# Patient Record
Sex: FEMALE | Race: WHITE | Employment: OTHER | URBAN - METROPOLITAN AREA
[De-identification: names, ages, dates, MRNs, and addresses within clinical notes are randomized per-mention and may not be internally consistent; named-entity substitution may affect disease eponyms.]

---

## 2018-05-16 ENCOUNTER — HOSPITAL ENCOUNTER (OUTPATIENT)
Age: 63
Discharge: HOME | End: 2018-05-18
Payer: COMMERCIAL

## 2018-05-16 LAB
ALBUMIN SERPL-MCNC: 4.3 G/DL (ref 3.5–5.2)
ALP BLD-CCNC: 52 U/L (ref 35–104)
ALT SERPL-CCNC: 13 U/L (ref 0–32)
ANION GAP SERPL CALCULATED.3IONS-SCNC: 15 MMOL/L (ref 7–16)
AST SERPL-CCNC: 19 U/L (ref 0–31)
BASOPHILS ABSOLUTE: 0.03 E9/L (ref 0–0.2)
BASOPHILS RELATIVE PERCENT: 0.8 % (ref 0–2)
BILIRUB SERPL-MCNC: 0.4 MG/DL (ref 0–1.2)
BUN BLDV-MCNC: 9 MG/DL (ref 8–23)
CALCIUM SERPL-MCNC: 9.2 MG/DL (ref 8.6–10.2)
CHLORIDE BLD-SCNC: 104 MMOL/L (ref 98–107)
CHOLESTEROL, TOTAL: 248 MG/DL (ref 0–199)
CO2: 23 MMOL/L (ref 22–29)
CREAT SERPL-MCNC: 0.6 MG/DL (ref 0.5–1)
EOSINOPHILS ABSOLUTE: 0.16 E9/L (ref 0.05–0.5)
EOSINOPHILS RELATIVE PERCENT: 4.2 % (ref 0–6)
GFR AFRICAN AMERICAN: >60
GFR NON-AFRICAN AMERICAN: >60 ML/MIN/1.73
GLUCOSE BLD-MCNC: 94 MG/DL (ref 74–109)
HCT VFR BLD CALC: 43.4 % (ref 34–48)
HDLC SERPL-MCNC: 96 MG/DL
HEMOGLOBIN: 14.3 G/DL (ref 11.5–15.5)
IMMATURE GRANULOCYTES #: 0.01 E9/L
IMMATURE GRANULOCYTES %: 0.3 % (ref 0–5)
LDL CHOLESTEROL CALCULATED: 135 MG/DL (ref 0–99)
LYMPHOCYTES ABSOLUTE: 1.4 E9/L (ref 1.5–4)
LYMPHOCYTES RELATIVE PERCENT: 37.1 % (ref 20–42)
MCH RBC QN AUTO: 33.4 PG (ref 26–35)
MCHC RBC AUTO-ENTMCNC: 32.9 % (ref 32–34.5)
MCV RBC AUTO: 101.4 FL (ref 80–99.9)
MONOCYTES ABSOLUTE: 0.33 E9/L (ref 0.1–0.95)
MONOCYTES RELATIVE PERCENT: 8.8 % (ref 2–12)
NEUTROPHILS ABSOLUTE: 1.84 E9/L (ref 1.8–7.3)
NEUTROPHILS RELATIVE PERCENT: 48.8 % (ref 43–80)
PDW BLD-RTO: 13 FL (ref 11.5–15)
PLATELET # BLD: 230 E9/L (ref 130–450)
PMV BLD AUTO: 9.2 FL (ref 7–12)
POTASSIUM SERPL-SCNC: 4.6 MMOL/L (ref 3.5–5)
RBC # BLD: 4.28 E12/L (ref 3.5–5.5)
SODIUM BLD-SCNC: 142 MMOL/L (ref 132–146)
TOTAL PROTEIN: 7 G/DL (ref 6.4–8.3)
TRIGL SERPL-MCNC: 86 MG/DL (ref 0–149)
TSH SERPL DL<=0.05 MIU/L-ACNC: 2.09 UIU/ML (ref 0.27–4.2)
VITAMIN D 25-HYDROXY: 20 NG/ML (ref 30–100)
VLDLC SERPL CALC-MCNC: 17 MG/DL
WBC # BLD: 3.8 E9/L (ref 4.5–11.5)

## 2018-05-16 PROCEDURE — 84443 ASSAY THYROID STIM HORMONE: CPT

## 2018-05-16 PROCEDURE — 82306 VITAMIN D 25 HYDROXY: CPT

## 2018-05-16 PROCEDURE — 80053 COMPREHEN METABOLIC PANEL: CPT

## 2018-05-16 PROCEDURE — 85025 COMPLETE CBC W/AUTO DIFF WBC: CPT

## 2018-05-16 PROCEDURE — 80061 LIPID PANEL: CPT

## 2019-01-12 ENCOUNTER — HOSPITAL ENCOUNTER (INPATIENT)
Age: 64
LOS: 1 days | Discharge: HOME | DRG: 244 | End: 2019-01-14
Attending: EMERGENCY MEDICINE | Admitting: INTERNAL MEDICINE
Payer: COMMERCIAL

## 2019-01-12 ENCOUNTER — APPOINTMENT (OUTPATIENT)
Dept: CT IMAGING | Age: 64
DRG: 244 | End: 2019-01-12
Payer: COMMERCIAL

## 2019-01-12 DIAGNOSIS — K57.32 DIVERTICULITIS OF COLON: Primary | ICD-10-CM

## 2019-01-12 DIAGNOSIS — R10.12 LEFT UPPER QUADRANT PAIN: ICD-10-CM

## 2019-01-12 DIAGNOSIS — T36.8X5A: ICD-10-CM

## 2019-01-12 LAB
ALBUMIN SERPL-MCNC: 4.4 G/DL (ref 3.5–5.2)
ALP BLD-CCNC: 73 U/L (ref 35–104)
ALT SERPL-CCNC: 16 U/L (ref 0–32)
ANION GAP SERPL CALCULATED.3IONS-SCNC: 13 MMOL/L (ref 7–16)
APTT: 34.8 SEC (ref 24.5–35.1)
AST SERPL-CCNC: 15 U/L (ref 0–31)
BASOPHILS ABSOLUTE: 0.05 E9/L (ref 0–0.2)
BASOPHILS RELATIVE PERCENT: 0.4 % (ref 0–2)
BILIRUB SERPL-MCNC: 0.6 MG/DL (ref 0–1.2)
BILIRUBIN URINE: ABNORMAL
BLOOD, URINE: NEGATIVE
BUN BLDV-MCNC: 11 MG/DL (ref 8–23)
CALCIUM SERPL-MCNC: 9.2 MG/DL (ref 8.6–10.2)
CHLORIDE BLD-SCNC: 103 MMOL/L (ref 98–107)
CLARITY: CLEAR
CO2: 22 MMOL/L (ref 22–29)
COLOR: YELLOW
CREAT SERPL-MCNC: 0.6 MG/DL (ref 0.5–1)
EOSINOPHILS ABSOLUTE: 0.2 E9/L (ref 0.05–0.5)
EOSINOPHILS RELATIVE PERCENT: 1.8 % (ref 0–6)
GFR AFRICAN AMERICAN: >60
GFR NON-AFRICAN AMERICAN: >60 ML/MIN/1.73
GLUCOSE BLD-MCNC: 133 MG/DL (ref 74–99)
GLUCOSE URINE: NEGATIVE MG/DL
HCT VFR BLD CALC: 43.5 % (ref 34–48)
HEMOGLOBIN: 14.8 G/DL (ref 11.5–15.5)
IMMATURE GRANULOCYTES #: 0.02 E9/L
IMMATURE GRANULOCYTES %: 0.2 % (ref 0–5)
INR BLD: 1
KETONES, URINE: ABNORMAL MG/DL
LACTIC ACID: 1 MMOL/L (ref 0.5–2.2)
LEUKOCYTE ESTERASE, URINE: NEGATIVE
LIPASE: 22 U/L (ref 13–60)
LYMPHOCYTES ABSOLUTE: 2.32 E9/L (ref 1.5–4)
LYMPHOCYTES RELATIVE PERCENT: 20.5 % (ref 20–42)
MCH RBC QN AUTO: 32.2 PG (ref 26–35)
MCHC RBC AUTO-ENTMCNC: 34 % (ref 32–34.5)
MCV RBC AUTO: 94.8 FL (ref 80–99.9)
MONOCYTES ABSOLUTE: 0.83 E9/L (ref 0.1–0.95)
MONOCYTES RELATIVE PERCENT: 7.3 % (ref 2–12)
NEUTROPHILS ABSOLUTE: 7.89 E9/L (ref 1.8–7.3)
NEUTROPHILS RELATIVE PERCENT: 69.8 % (ref 43–80)
NITRITE, URINE: NEGATIVE
PDW BLD-RTO: 12.6 FL (ref 11.5–15)
PH UA: 5 (ref 5–9)
PLATELET # BLD: 240 E9/L (ref 130–450)
PMV BLD AUTO: 8.9 FL (ref 7–12)
POTASSIUM REFLEX MAGNESIUM: 4.4 MMOL/L (ref 3.5–5)
PROTEIN UA: NEGATIVE MG/DL
PROTHROMBIN TIME: 11.6 SEC (ref 9.3–12.4)
RBC # BLD: 4.59 E12/L (ref 3.5–5.5)
SODIUM BLD-SCNC: 138 MMOL/L (ref 132–146)
SPECIFIC GRAVITY UA: >=1.03 (ref 1–1.03)
TOTAL PROTEIN: 7.2 G/DL (ref 6.4–8.3)
UROBILINOGEN, URINE: 0.2 E.U./DL
WBC # BLD: 11.3 E9/L (ref 4.5–11.5)

## 2019-01-12 PROCEDURE — 74177 CT ABD & PELVIS W/CONTRAST: CPT

## 2019-01-12 PROCEDURE — 2580000003 HC RX 258: Performed by: RADIOLOGY

## 2019-01-12 PROCEDURE — 85610 PROTHROMBIN TIME: CPT

## 2019-01-12 PROCEDURE — 99285 EMERGENCY DEPT VISIT HI MDM: CPT

## 2019-01-12 PROCEDURE — 81003 URINALYSIS AUTO W/O SCOPE: CPT

## 2019-01-12 PROCEDURE — 83605 ASSAY OF LACTIC ACID: CPT

## 2019-01-12 PROCEDURE — 6360000004 HC RX CONTRAST MEDICATION: Performed by: RADIOLOGY

## 2019-01-12 PROCEDURE — 80053 COMPREHEN METABOLIC PANEL: CPT

## 2019-01-12 PROCEDURE — 83690 ASSAY OF LIPASE: CPT

## 2019-01-12 PROCEDURE — 85025 COMPLETE CBC W/AUTO DIFF WBC: CPT

## 2019-01-12 PROCEDURE — 85730 THROMBOPLASTIN TIME PARTIAL: CPT

## 2019-01-12 PROCEDURE — 2580000003 HC RX 258: Performed by: NURSE PRACTITIONER

## 2019-01-12 RX ORDER — MORPHINE SULFATE 2 MG/ML
4 INJECTION, SOLUTION INTRAMUSCULAR; INTRAVENOUS ONCE
Status: DISCONTINUED | OUTPATIENT
Start: 2019-01-13 | End: 2019-01-14

## 2019-01-12 RX ORDER — 0.9 % SODIUM CHLORIDE 0.9 %
1000 INTRAVENOUS SOLUTION INTRAVENOUS ONCE
Status: COMPLETED | OUTPATIENT
Start: 2019-01-12 | End: 2019-01-13

## 2019-01-12 RX ORDER — SODIUM CHLORIDE 0.9 % (FLUSH) 0.9 %
10 SYRINGE (ML) INJECTION PRN
Status: DISCONTINUED | OUTPATIENT
Start: 2019-01-12 | End: 2019-01-14 | Stop reason: HOSPADM

## 2019-01-12 RX ORDER — CIPROFLOXACIN 2 MG/ML
400 INJECTION, SOLUTION INTRAVENOUS ONCE
Status: COMPLETED | OUTPATIENT
Start: 2019-01-13 | End: 2019-01-13

## 2019-01-12 RX ORDER — SODIUM CHLORIDE 9 MG/ML
1000 INJECTION, SOLUTION INTRAVENOUS CONTINUOUS
Status: DISCONTINUED | OUTPATIENT
Start: 2019-01-13 | End: 2019-01-13

## 2019-01-12 RX ORDER — 0.9 % SODIUM CHLORIDE 0.9 %
1000 INTRAVENOUS SOLUTION INTRAVENOUS ONCE
Status: DISCONTINUED | OUTPATIENT
Start: 2019-01-13 | End: 2019-01-14

## 2019-01-12 RX ORDER — LISINOPRIL 5 MG/1
5 TABLET ORAL DAILY
COMMUNITY
End: 2021-01-20 | Stop reason: SDUPTHER

## 2019-01-12 RX ORDER — ROSUVASTATIN CALCIUM 5 MG/1
5 TABLET, COATED ORAL EVERY OTHER DAY
COMMUNITY
End: 2021-01-20

## 2019-01-12 RX ORDER — ONDANSETRON 2 MG/ML
4 INJECTION INTRAMUSCULAR; INTRAVENOUS ONCE
Status: DISCONTINUED | OUTPATIENT
Start: 2019-01-13 | End: 2019-01-14

## 2019-01-12 RX ADMIN — Medication 10 ML: at 23:11

## 2019-01-12 RX ADMIN — SODIUM CHLORIDE 1000 ML: 9 INJECTION, SOLUTION INTRAVENOUS at 21:45

## 2019-01-12 RX ADMIN — IOPAMIDOL 110 ML: 755 INJECTION, SOLUTION INTRAVENOUS at 23:12

## 2019-01-13 PROBLEM — K57.92 DIVERTICULITIS: Status: ACTIVE | Noted: 2019-01-13

## 2019-01-13 PROBLEM — K57.92 DIVERTICULITIS: Status: RESOLVED | Noted: 2019-01-13 | Resolved: 2019-01-13

## 2019-01-13 LAB
ALBUMIN SERPL-MCNC: 3.7 G/DL (ref 3.5–5.2)
ALP BLD-CCNC: 55 U/L (ref 35–104)
ALT SERPL-CCNC: 12 U/L (ref 0–32)
ANION GAP SERPL CALCULATED.3IONS-SCNC: 10 MMOL/L (ref 7–16)
AST SERPL-CCNC: 10 U/L (ref 0–31)
BASOPHILS ABSOLUTE: 0.04 E9/L (ref 0–0.2)
BASOPHILS RELATIVE PERCENT: 0.6 % (ref 0–2)
BILIRUB SERPL-MCNC: 0.5 MG/DL (ref 0–1.2)
BUN BLDV-MCNC: 9 MG/DL (ref 8–23)
CALCIUM SERPL-MCNC: 8.3 MG/DL (ref 8.6–10.2)
CHLORIDE BLD-SCNC: 105 MMOL/L (ref 98–107)
CHOLESTEROL, TOTAL: 158 MG/DL (ref 0–199)
CO2: 23 MMOL/L (ref 22–29)
CREAT SERPL-MCNC: 0.6 MG/DL (ref 0.5–1)
EOSINOPHILS ABSOLUTE: 0.18 E9/L (ref 0.05–0.5)
EOSINOPHILS RELATIVE PERCENT: 2.5 % (ref 0–6)
FOLATE: 11.7 NG/ML (ref 4.8–24.2)
GFR AFRICAN AMERICAN: >60
GFR NON-AFRICAN AMERICAN: >60 ML/MIN/1.73
GLUCOSE BLD-MCNC: 108 MG/DL (ref 74–99)
HBA1C MFR BLD: 5.5 % (ref 4–5.6)
HCT VFR BLD CALC: 36.7 % (ref 34–48)
HDLC SERPL-MCNC: 70 MG/DL
HEMOGLOBIN: 12.3 G/DL (ref 11.5–15.5)
IMMATURE GRANULOCYTES #: 0.01 E9/L
IMMATURE GRANULOCYTES %: 0.1 % (ref 0–5)
LACTIC ACID, SEPSIS: 0.8 MMOL/L (ref 0.5–1.9)
LDL CHOLESTEROL CALCULATED: 70 MG/DL (ref 0–99)
LYMPHOCYTES ABSOLUTE: 2.25 E9/L (ref 1.5–4)
LYMPHOCYTES RELATIVE PERCENT: 31.9 % (ref 20–42)
MAGNESIUM: 2 MG/DL (ref 1.6–2.6)
MCH RBC QN AUTO: 32.5 PG (ref 26–35)
MCHC RBC AUTO-ENTMCNC: 33.5 % (ref 32–34.5)
MCV RBC AUTO: 96.8 FL (ref 80–99.9)
MONOCYTES ABSOLUTE: 0.54 E9/L (ref 0.1–0.95)
MONOCYTES RELATIVE PERCENT: 7.6 % (ref 2–12)
NEUTROPHILS ABSOLUTE: 4.04 E9/L (ref 1.8–7.3)
NEUTROPHILS RELATIVE PERCENT: 57.3 % (ref 43–80)
PDW BLD-RTO: 12.8 FL (ref 11.5–15)
PHOSPHORUS: 4.1 MG/DL (ref 2.5–4.5)
PLATELET # BLD: 189 E9/L (ref 130–450)
PMV BLD AUTO: 9.2 FL (ref 7–12)
POTASSIUM SERPL-SCNC: 3.8 MMOL/L (ref 3.5–5)
PRO-BNP: 65 PG/ML (ref 0–125)
PROCALCITONIN: 0.08 NG/ML (ref 0–0.08)
RBC # BLD: 3.79 E12/L (ref 3.5–5.5)
SODIUM BLD-SCNC: 138 MMOL/L (ref 132–146)
TOTAL PROTEIN: 5.8 G/DL (ref 6.4–8.3)
TRIGL SERPL-MCNC: 88 MG/DL (ref 0–149)
VITAMIN B-12: 275 PG/ML (ref 211–946)
VLDLC SERPL CALC-MCNC: 18 MG/DL
WBC # BLD: 7.1 E9/L (ref 4.5–11.5)

## 2019-01-13 PROCEDURE — 83735 ASSAY OF MAGNESIUM: CPT

## 2019-01-13 PROCEDURE — 2580000003 HC RX 258: Performed by: NURSE PRACTITIONER

## 2019-01-13 PROCEDURE — 82607 VITAMIN B-12: CPT

## 2019-01-13 PROCEDURE — 84145 PROCALCITONIN (PCT): CPT

## 2019-01-13 PROCEDURE — 2500000003 HC RX 250 WO HCPCS: Performed by: NURSE PRACTITIONER

## 2019-01-13 PROCEDURE — 97530 THERAPEUTIC ACTIVITIES: CPT

## 2019-01-13 PROCEDURE — 83880 ASSAY OF NATRIURETIC PEPTIDE: CPT

## 2019-01-13 PROCEDURE — 82746 ASSAY OF FOLIC ACID SERUM: CPT

## 2019-01-13 PROCEDURE — 80053 COMPREHEN METABOLIC PANEL: CPT

## 2019-01-13 PROCEDURE — 96374 THER/PROPH/DIAG INJ IV PUSH: CPT

## 2019-01-13 PROCEDURE — 1200000000 HC SEMI PRIVATE

## 2019-01-13 PROCEDURE — 85025 COMPLETE CBC W/AUTO DIFF WBC: CPT

## 2019-01-13 PROCEDURE — 87040 BLOOD CULTURE FOR BACTERIA: CPT

## 2019-01-13 PROCEDURE — 2500000003 HC RX 250 WO HCPCS: Performed by: INTERNAL MEDICINE

## 2019-01-13 PROCEDURE — 83605 ASSAY OF LACTIC ACID: CPT

## 2019-01-13 PROCEDURE — 6360000002 HC RX W HCPCS: Performed by: NURSE PRACTITIONER

## 2019-01-13 PROCEDURE — 6360000002 HC RX W HCPCS: Performed by: INTERNAL MEDICINE

## 2019-01-13 PROCEDURE — 36591 DRAW BLOOD OFF VENOUS DEVICE: CPT

## 2019-01-13 PROCEDURE — 97161 PT EVAL LOW COMPLEX 20 MIN: CPT

## 2019-01-13 PROCEDURE — 97116 GAIT TRAINING THERAPY: CPT

## 2019-01-13 PROCEDURE — 2580000003 HC RX 258: Performed by: EMERGENCY MEDICINE

## 2019-01-13 PROCEDURE — 36415 COLL VENOUS BLD VENIPUNCTURE: CPT

## 2019-01-13 PROCEDURE — 83036 HEMOGLOBIN GLYCOSYLATED A1C: CPT

## 2019-01-13 PROCEDURE — 6370000000 HC RX 637 (ALT 250 FOR IP): Performed by: INTERNAL MEDICINE

## 2019-01-13 PROCEDURE — 2580000003 HC RX 258: Performed by: INTERNAL MEDICINE

## 2019-01-13 PROCEDURE — 80061 LIPID PANEL: CPT

## 2019-01-13 PROCEDURE — 84100 ASSAY OF PHOSPHORUS: CPT

## 2019-01-13 RX ORDER — MORPHINE SULFATE 2 MG/ML
2 INJECTION, SOLUTION INTRAMUSCULAR; INTRAVENOUS
Status: DISCONTINUED | OUTPATIENT
Start: 2019-01-13 | End: 2019-01-14

## 2019-01-13 RX ORDER — ROSUVASTATIN CALCIUM 5 MG/1
5 TABLET, COATED ORAL DAILY
Status: DISCONTINUED | OUTPATIENT
Start: 2019-01-13 | End: 2019-01-14 | Stop reason: HOSPADM

## 2019-01-13 RX ORDER — SODIUM CHLORIDE 9 MG/ML
INJECTION, SOLUTION INTRAVENOUS CONTINUOUS
Status: DISCONTINUED | OUTPATIENT
Start: 2019-01-13 | End: 2019-01-14

## 2019-01-13 RX ORDER — MORPHINE SULFATE 2 MG/ML
4 INJECTION, SOLUTION INTRAMUSCULAR; INTRAVENOUS
Status: DISCONTINUED | OUTPATIENT
Start: 2019-01-13 | End: 2019-01-14

## 2019-01-13 RX ORDER — CIPROFLOXACIN 2 MG/ML
400 INJECTION, SOLUTION INTRAVENOUS EVERY 12 HOURS
Status: DISCONTINUED | OUTPATIENT
Start: 2019-01-13 | End: 2019-01-14 | Stop reason: HOSPADM

## 2019-01-13 RX ORDER — PANTOPRAZOLE SODIUM 40 MG/1
40 TABLET, DELAYED RELEASE ORAL
Status: DISCONTINUED | OUTPATIENT
Start: 2019-01-13 | End: 2019-01-14 | Stop reason: HOSPADM

## 2019-01-13 RX ORDER — OXYCODONE HYDROCHLORIDE AND ACETAMINOPHEN 5; 325 MG/1; MG/1
2 TABLET ORAL EVERY 4 HOURS PRN
Status: DISCONTINUED | OUTPATIENT
Start: 2019-01-13 | End: 2019-01-14

## 2019-01-13 RX ORDER — OXYCODONE HYDROCHLORIDE AND ACETAMINOPHEN 5; 325 MG/1; MG/1
1 TABLET ORAL EVERY 4 HOURS PRN
Status: DISCONTINUED | OUTPATIENT
Start: 2019-01-13 | End: 2019-01-14

## 2019-01-13 RX ORDER — LISINOPRIL 5 MG/1
5 TABLET ORAL DAILY
Status: DISCONTINUED | OUTPATIENT
Start: 2019-01-13 | End: 2019-01-14 | Stop reason: HOSPADM

## 2019-01-13 RX ORDER — ACETAMINOPHEN 325 MG/1
650 TABLET ORAL EVERY 4 HOURS PRN
Status: DISCONTINUED | OUTPATIENT
Start: 2019-01-13 | End: 2019-01-14 | Stop reason: HOSPADM

## 2019-01-13 RX ORDER — DIPHENHYDRAMINE HYDROCHLORIDE 50 MG/ML
25 INJECTION INTRAMUSCULAR; INTRAVENOUS ONCE
Status: COMPLETED | OUTPATIENT
Start: 2019-01-13 | End: 2019-01-13

## 2019-01-13 RX ADMIN — OXYCODONE AND ACETAMINOPHEN 1 TABLET: 5; 325 TABLET ORAL at 01:54

## 2019-01-13 RX ADMIN — METRONIDAZOLE 500 MG: 500 INJECTION, SOLUTION INTRAVENOUS at 10:31

## 2019-01-13 RX ADMIN — Medication 10 ML: at 01:36

## 2019-01-13 RX ADMIN — METRONIDAZOLE 500 MG: 500 INJECTION, SOLUTION INTRAVENOUS at 01:36

## 2019-01-13 RX ADMIN — LISINOPRIL 5 MG: 5 TABLET ORAL at 08:16

## 2019-01-13 RX ADMIN — CIPROFLOXACIN 400 MG: 2 INJECTION, SOLUTION INTRAVENOUS at 23:58

## 2019-01-13 RX ADMIN — SODIUM CHLORIDE: 9 INJECTION, SOLUTION INTRAVENOUS at 20:47

## 2019-01-13 RX ADMIN — CIPROFLOXACIN 400 MG: 2 INJECTION, SOLUTION INTRAVENOUS at 00:15

## 2019-01-13 RX ADMIN — ENOXAPARIN SODIUM 40 MG: 40 INJECTION SUBCUTANEOUS at 08:15

## 2019-01-13 RX ADMIN — METRONIDAZOLE 500 MG: 500 INJECTION, SOLUTION INTRAVENOUS at 18:35

## 2019-01-13 RX ADMIN — SODIUM CHLORIDE 1000 ML: 9 INJECTION, SOLUTION INTRAVENOUS at 00:15

## 2019-01-13 RX ADMIN — PANTOPRAZOLE SODIUM 40 MG: 40 TABLET, DELAYED RELEASE ORAL at 05:36

## 2019-01-13 RX ADMIN — DIPHENHYDRAMINE HYDROCHLORIDE 25 MG: 50 INJECTION INTRAMUSCULAR; INTRAVENOUS at 00:57

## 2019-01-13 RX ADMIN — CIPROFLOXACIN 400 MG: 2 INJECTION, SOLUTION INTRAVENOUS at 12:03

## 2019-01-13 RX ADMIN — ACETAMINOPHEN 650 MG: 325 TABLET ORAL at 20:46

## 2019-01-13 RX ADMIN — SODIUM CHLORIDE: 9 INJECTION, SOLUTION INTRAVENOUS at 01:39

## 2019-01-13 ASSESSMENT — PAIN DESCRIPTION - ONSET
ONSET: ON-GOING

## 2019-01-13 ASSESSMENT — PAIN SCALES - GENERAL
PAINLEVEL_OUTOF10: 5
PAINLEVEL_OUTOF10: 3
PAINLEVEL_OUTOF10: 2
PAINLEVEL_OUTOF10: 0
PAINLEVEL_OUTOF10: 4

## 2019-01-13 ASSESSMENT — PAIN DESCRIPTION - LOCATION
LOCATION: ABDOMEN
LOCATION: ABDOMEN
LOCATION: BACK
LOCATION: ABDOMEN

## 2019-01-13 ASSESSMENT — PAIN DESCRIPTION - ORIENTATION
ORIENTATION: MID;LOWER
ORIENTATION: LOWER
ORIENTATION: MID

## 2019-01-13 ASSESSMENT — PAIN DESCRIPTION - PROGRESSION
CLINICAL_PROGRESSION: GRADUALLY WORSENING
CLINICAL_PROGRESSION: NOT CHANGED

## 2019-01-13 ASSESSMENT — PAIN DESCRIPTION - PAIN TYPE
TYPE: ACUTE PAIN

## 2019-01-13 ASSESSMENT — PAIN DESCRIPTION - FREQUENCY
FREQUENCY: CONTINUOUS

## 2019-01-13 ASSESSMENT — PAIN DESCRIPTION - DESCRIPTORS
DESCRIPTORS: ACHING

## 2019-01-14 VITALS
WEIGHT: 162.8 LBS | RESPIRATION RATE: 16 BRPM | SYSTOLIC BLOOD PRESSURE: 130 MMHG | OXYGEN SATURATION: 97 % | TEMPERATURE: 98.5 F | HEART RATE: 74 BPM | HEIGHT: 64 IN | DIASTOLIC BLOOD PRESSURE: 62 MMHG | BODY MASS INDEX: 27.79 KG/M2

## 2019-01-14 LAB
ALBUMIN SERPL-MCNC: 3.4 G/DL (ref 3.5–5.2)
ALP BLD-CCNC: 48 U/L (ref 35–104)
ALT SERPL-CCNC: 10 U/L (ref 0–32)
ANION GAP SERPL CALCULATED.3IONS-SCNC: 8 MMOL/L (ref 7–16)
AST SERPL-CCNC: 10 U/L (ref 0–31)
BASOPHILS ABSOLUTE: 0.02 E9/L (ref 0–0.2)
BASOPHILS RELATIVE PERCENT: 0.4 % (ref 0–2)
BILIRUB SERPL-MCNC: 0.5 MG/DL (ref 0–1.2)
BUN BLDV-MCNC: 6 MG/DL (ref 8–23)
CALCIUM SERPL-MCNC: 8.2 MG/DL (ref 8.6–10.2)
CHLORIDE BLD-SCNC: 109 MMOL/L (ref 98–107)
CO2: 22 MMOL/L (ref 22–29)
CREAT SERPL-MCNC: 0.5 MG/DL (ref 0.5–1)
EOSINOPHILS ABSOLUTE: 0.19 E9/L (ref 0.05–0.5)
EOSINOPHILS RELATIVE PERCENT: 4 % (ref 0–6)
GFR AFRICAN AMERICAN: >60
GFR NON-AFRICAN AMERICAN: >60 ML/MIN/1.73
GLUCOSE BLD-MCNC: 98 MG/DL (ref 74–99)
HCT VFR BLD CALC: 34.1 % (ref 34–48)
HEMOGLOBIN: 11.6 G/DL (ref 11.5–15.5)
IMMATURE GRANULOCYTES #: 0.02 E9/L
IMMATURE GRANULOCYTES %: 0.4 % (ref 0–5)
LYMPHOCYTES ABSOLUTE: 1.56 E9/L (ref 1.5–4)
LYMPHOCYTES RELATIVE PERCENT: 32.8 % (ref 20–42)
MAGNESIUM: 1.8 MG/DL (ref 1.6–2.6)
MCH RBC QN AUTO: 32.8 PG (ref 26–35)
MCHC RBC AUTO-ENTMCNC: 34 % (ref 32–34.5)
MCV RBC AUTO: 96.3 FL (ref 80–99.9)
MONOCYTES ABSOLUTE: 0.42 E9/L (ref 0.1–0.95)
MONOCYTES RELATIVE PERCENT: 8.8 % (ref 2–12)
NEUTROPHILS ABSOLUTE: 2.55 E9/L (ref 1.8–7.3)
NEUTROPHILS RELATIVE PERCENT: 53.6 % (ref 43–80)
PDW BLD-RTO: 12.7 FL (ref 11.5–15)
PHOSPHORUS: 3.9 MG/DL (ref 2.5–4.5)
PLATELET # BLD: 162 E9/L (ref 130–450)
PMV BLD AUTO: 9.1 FL (ref 7–12)
POTASSIUM SERPL-SCNC: 4 MMOL/L (ref 3.5–5)
RBC # BLD: 3.54 E12/L (ref 3.5–5.5)
SODIUM BLD-SCNC: 139 MMOL/L (ref 132–146)
TOTAL PROTEIN: 5.4 G/DL (ref 6.4–8.3)
TSH SERPL DL<=0.05 MIU/L-ACNC: 3.3 UIU/ML (ref 0.27–4.2)
URIC ACID, SERUM: 3.9 MG/DL (ref 2.4–5.7)
WBC # BLD: 4.8 E9/L (ref 4.5–11.5)

## 2019-01-14 PROCEDURE — 80053 COMPREHEN METABOLIC PANEL: CPT

## 2019-01-14 PROCEDURE — 36415 COLL VENOUS BLD VENIPUNCTURE: CPT

## 2019-01-14 PROCEDURE — 2500000003 HC RX 250 WO HCPCS: Performed by: INTERNAL MEDICINE

## 2019-01-14 PROCEDURE — 6360000002 HC RX W HCPCS: Performed by: INTERNAL MEDICINE

## 2019-01-14 PROCEDURE — 83735 ASSAY OF MAGNESIUM: CPT

## 2019-01-14 PROCEDURE — 84443 ASSAY THYROID STIM HORMONE: CPT

## 2019-01-14 PROCEDURE — 84550 ASSAY OF BLOOD/URIC ACID: CPT

## 2019-01-14 PROCEDURE — 6370000000 HC RX 637 (ALT 250 FOR IP): Performed by: INTERNAL MEDICINE

## 2019-01-14 PROCEDURE — 84100 ASSAY OF PHOSPHORUS: CPT

## 2019-01-14 PROCEDURE — 85025 COMPLETE CBC W/AUTO DIFF WBC: CPT

## 2019-01-14 PROCEDURE — 97165 OT EVAL LOW COMPLEX 30 MIN: CPT

## 2019-01-14 RX ORDER — CIPROFLOXACIN 500 MG/1
500 TABLET, FILM COATED ORAL 2 TIMES DAILY
Qty: 20 TABLET | Refills: 0 | Status: SHIPPED | OUTPATIENT
Start: 2019-01-14 | End: 2019-01-24

## 2019-01-14 RX ORDER — METRONIDAZOLE 500 MG/1
500 TABLET ORAL 3 TIMES DAILY
Qty: 30 TABLET | Refills: 0 | Status: SHIPPED | OUTPATIENT
Start: 2019-01-14 | End: 2019-01-24

## 2019-01-14 RX ORDER — OXYCODONE HYDROCHLORIDE AND ACETAMINOPHEN 5; 325 MG/1; MG/1
1 TABLET ORAL EVERY 4 HOURS PRN
Status: DISCONTINUED | OUTPATIENT
Start: 2019-01-14 | End: 2019-01-14 | Stop reason: HOSPADM

## 2019-01-14 RX ADMIN — PANTOPRAZOLE SODIUM 40 MG: 40 TABLET, DELAYED RELEASE ORAL at 05:07

## 2019-01-14 RX ADMIN — METRONIDAZOLE 500 MG: 500 INJECTION, SOLUTION INTRAVENOUS at 01:33

## 2019-01-14 RX ADMIN — ENOXAPARIN SODIUM 40 MG: 40 INJECTION SUBCUTANEOUS at 09:16

## 2019-01-14 RX ADMIN — METRONIDAZOLE 500 MG: 500 INJECTION, SOLUTION INTRAVENOUS at 09:16

## 2019-01-14 RX ADMIN — CIPROFLOXACIN 400 MG: 2 INJECTION, SOLUTION INTRAVENOUS at 11:19

## 2019-01-14 RX ADMIN — ROSUVASTATIN CALCIUM 5 MG: 5 TABLET, FILM COATED ORAL at 09:16

## 2019-01-14 RX ADMIN — LISINOPRIL 5 MG: 5 TABLET ORAL at 09:16

## 2019-01-14 ASSESSMENT — PAIN SCALES - GENERAL: PAINLEVEL_OUTOF10: 0

## 2019-01-18 LAB — BLOOD CULTURE, ROUTINE: NORMAL

## 2019-05-07 LAB
ALBUMIN SERPL-MCNC: NORMAL G/DL
ALP BLD-CCNC: NORMAL U/L
ALT SERPL-CCNC: NORMAL U/L
ANION GAP SERPL CALCULATED.3IONS-SCNC: NORMAL MMOL/L
AST SERPL-CCNC: NORMAL U/L
AVERAGE GLUCOSE: NORMAL
BILIRUB SERPL-MCNC: NORMAL MG/DL
BILIRUBIN, URINE: NORMAL
BLOOD, URINE: NORMAL
BUN BLDV-MCNC: NORMAL MG/DL
CALCIUM SERPL-MCNC: NORMAL MG/DL
CHLORIDE BLD-SCNC: NORMAL MMOL/L
CHOLESTEROL, TOTAL: NORMAL
CHOLESTEROL/HDL RATIO: NORMAL
CLARITY: NORMAL
CO2: NORMAL
COLOR: NORMAL
CREAT SERPL-MCNC: NORMAL MG/DL
GFR CALCULATED: NORMAL
GLUCOSE BLD-MCNC: NORMAL MG/DL
GLUCOSE URINE: NORMAL
HBA1C MFR BLD: 5.1 %
HDLC SERPL-MCNC: NORMAL MG/DL
KETONES, URINE: NORMAL
LDL CHOLESTEROL CALCULATED: NORMAL
LEUKOCYTE ESTERASE, URINE: NORMAL
NITRITE, URINE: NORMAL
NONHDLC SERPL-MCNC: NORMAL MG/DL
PH UA: NORMAL
POTASSIUM SERPL-SCNC: NORMAL MMOL/L
PROTEIN UA: NORMAL
SODIUM BLD-SCNC: NORMAL MMOL/L
SPECIFIC GRAVITY, URINE: NORMAL
TOTAL PROTEIN: NORMAL
TRIGL SERPL-MCNC: NORMAL MG/DL
TSH SERPL DL<=0.05 MIU/L-ACNC: NORMAL M[IU]/L
UROBILINOGEN, URINE: NORMAL
VLDLC SERPL CALC-MCNC: NORMAL MG/DL

## 2019-09-23 LAB
BASOPHILS ABSOLUTE: NORMAL
BASOPHILS RELATIVE PERCENT: NORMAL
EOSINOPHILS ABSOLUTE: NORMAL
EOSINOPHILS RELATIVE PERCENT: NORMAL
HCT VFR BLD CALC: NORMAL %
HEMOGLOBIN: NORMAL
LYMPHOCYTES ABSOLUTE: NORMAL
LYMPHOCYTES RELATIVE PERCENT: NORMAL
MCH RBC QN AUTO: NORMAL PG
MCHC RBC AUTO-ENTMCNC: NORMAL G/DL
MCV RBC AUTO: NORMAL FL
MONOCYTES ABSOLUTE: NORMAL
MONOCYTES RELATIVE PERCENT: NORMAL
NEUTROPHILS ABSOLUTE: NORMAL
NEUTROPHILS RELATIVE PERCENT: NORMAL
PLATELET # BLD: NORMAL 10*3/UL
PMV BLD AUTO: NORMAL FL
RBC # BLD: NORMAL 10*6/UL
VITAMIN D 25-HYDROXY: NORMAL
VITAMIN D2, 25 HYDROXY: NORMAL
VITAMIN D3,25 HYDROXY: NORMAL
WBC # BLD: NORMAL 10*3/UL

## 2020-08-20 RX ORDER — HYDROCHLOROTHIAZIDE 12.5 MG/1
12.5 CAPSULE, GELATIN COATED ORAL
Qty: 24 CAPSULE | Refills: 3 | Status: SHIPPED
Start: 2020-08-20 | End: 2021-07-16 | Stop reason: SDUPTHER

## 2020-08-20 RX ORDER — HYDROCHLOROTHIAZIDE 12.5 MG/1
12.5 CAPSULE, GELATIN COATED ORAL
COMMUNITY
End: 2020-08-20 | Stop reason: SDUPTHER

## 2020-08-27 RX ORDER — CLOBETASOL PROPIONATE 0.5 MG/G
CREAM TOPICAL
COMMUNITY
End: 2021-07-27 | Stop reason: SDUPTHER

## 2020-08-27 RX ORDER — ATENOLOL 25 MG/1
TABLET ORAL
COMMUNITY
Start: 2019-09-25 | End: 2021-01-20 | Stop reason: SDUPTHER

## 2020-08-28 VITALS
DIASTOLIC BLOOD PRESSURE: 77 MMHG | SYSTOLIC BLOOD PRESSURE: 139 MMHG | HEIGHT: 64 IN | WEIGHT: 152 LBS | BODY MASS INDEX: 25.95 KG/M2

## 2020-08-28 RX ORDER — MULTIVIT-MIN/IRON/FOLIC ACID/K 18-600-40
2000 CAPSULE ORAL DAILY
COMMUNITY

## 2020-10-05 ENCOUNTER — TELEPHONE (OUTPATIENT)
Dept: PRIMARY CARE CLINIC | Age: 65
End: 2020-10-05

## 2020-10-05 NOTE — TELEPHONE ENCOUNTER
Pt states she went for flu shot and then had to go [facility] flu [facility].  She was given antibiotics and decadron, she always gets yeast infx and would like you to please send diflucan to [facility] on [address]

## 2020-10-06 RX ORDER — FLUCONAZOLE 150 MG/1
150 TABLET ORAL ONCE
Qty: 1 TABLET | Refills: 0 | Status: SHIPPED | OUTPATIENT
Start: 2020-10-06 | End: 2020-10-06

## 2021-01-20 RX ORDER — ROSUVASTATIN CALCIUM 5 MG/1
5 TABLET, COATED ORAL DAILY
COMMUNITY
End: 2021-01-20 | Stop reason: SDUPTHER

## 2021-01-20 RX ORDER — LISINOPRIL 5 MG/1
5 TABLET ORAL DAILY
Qty: 30 TABLET | Refills: 5 | Status: SHIPPED
Start: 2021-01-20 | End: 2021-01-25

## 2021-01-20 RX ORDER — ATENOLOL 25 MG/1
TABLET ORAL
Qty: 30 TABLET | Refills: 5 | Status: SHIPPED
Start: 2021-01-20 | End: 2021-07-16 | Stop reason: SDUPTHER

## 2021-01-20 RX ORDER — ROSUVASTATIN CALCIUM 5 MG/1
5 TABLET, COATED ORAL DAILY
Qty: 30 TABLET | Refills: 5 | Status: SHIPPED
Start: 2021-01-20 | End: 2021-07-27 | Stop reason: SDUPTHER

## 2021-01-25 DIAGNOSIS — I10 ESSENTIAL HYPERTENSION: Primary | ICD-10-CM

## 2021-01-25 RX ORDER — LISINOPRIL 10 MG/1
10 TABLET ORAL DAILY
COMMUNITY
End: 2021-01-25 | Stop reason: SDUPTHER

## 2021-01-25 RX ORDER — LISINOPRIL 10 MG/1
10 TABLET ORAL DAILY
Qty: 30 TABLET | Refills: 1 | Status: SHIPPED
Start: 2021-01-25 | End: 2021-04-27 | Stop reason: SDUPTHER

## 2021-01-26 ENCOUNTER — TELEPHONE (OUTPATIENT)
Dept: PRIMARY CARE CLINIC | Age: 66
End: 2021-01-26

## 2021-01-26 DIAGNOSIS — I10 HYPERTENSION, ESSENTIAL: Primary | ICD-10-CM

## 2021-01-26 DIAGNOSIS — E78.2 MIXED HYPERLIPIDEMIA: ICD-10-CM

## 2021-02-05 ENCOUNTER — HOSPITAL ENCOUNTER (OUTPATIENT)
Age: 66
Discharge: HOME | End: 2021-02-05
Payer: COMMERCIAL

## 2021-02-05 DIAGNOSIS — E78.2 MIXED HYPERLIPIDEMIA: ICD-10-CM

## 2021-02-05 DIAGNOSIS — I10 HYPERTENSION, ESSENTIAL: ICD-10-CM

## 2021-02-05 LAB
ALBUMIN SERPL-MCNC: 4.8 G/DL (ref 3.5–5.2)
ALP BLD-CCNC: 66 U/L (ref 35–104)
ALT SERPL-CCNC: 24 U/L (ref 0–32)
ANION GAP SERPL CALCULATED.3IONS-SCNC: 13 MMOL/L (ref 7–16)
AST SERPL-CCNC: 22 U/L (ref 0–31)
BILIRUB SERPL-MCNC: 0.6 MG/DL (ref 0–1.2)
BUN BLDV-MCNC: 14 MG/DL (ref 8–23)
CALCIUM SERPL-MCNC: 9.7 MG/DL (ref 8.6–10.2)
CHLORIDE BLD-SCNC: 101 MMOL/L (ref 98–107)
CHOLESTEROL, TOTAL: 216 MG/DL (ref 0–199)
CO2: 22 MMOL/L (ref 22–29)
CREAT SERPL-MCNC: 0.8 MG/DL (ref 0.5–1)
GFR AFRICAN AMERICAN: >60
GFR NON-AFRICAN AMERICAN: >60 ML/MIN/1.73
GLUCOSE BLD-MCNC: 133 MG/DL (ref 74–99)
HCT VFR BLD CALC: 45.6 % (ref 34–48)
HDLC SERPL-MCNC: 99 MG/DL
HEMOGLOBIN: 15.4 G/DL (ref 11.5–15.5)
LDL CHOLESTEROL CALCULATED: 100 MG/DL (ref 0–99)
MCH RBC QN AUTO: 32.6 PG (ref 26–35)
MCHC RBC AUTO-ENTMCNC: 33.8 % (ref 32–34.5)
MCV RBC AUTO: 96.6 FL (ref 80–99.9)
PDW BLD-RTO: 12.1 FL (ref 11.5–15)
PLATELET # BLD: 236 E9/L (ref 130–450)
PMV BLD AUTO: 8.7 FL (ref 7–12)
POTASSIUM SERPL-SCNC: 4.6 MMOL/L (ref 3.5–5)
RBC # BLD: 4.72 E12/L (ref 3.5–5.5)
SODIUM BLD-SCNC: 136 MMOL/L (ref 132–146)
TOTAL PROTEIN: 7.7 G/DL (ref 6.4–8.3)
TRIGL SERPL-MCNC: 86 MG/DL (ref 0–149)
TSH SERPL DL<=0.05 MIU/L-ACNC: 2.03 UIU/ML (ref 0.27–4.2)
VLDLC SERPL CALC-MCNC: 17 MG/DL
WBC # BLD: 6.6 E9/L (ref 4.5–11.5)

## 2021-02-05 PROCEDURE — 84443 ASSAY THYROID STIM HORMONE: CPT

## 2021-02-05 PROCEDURE — 80061 LIPID PANEL: CPT

## 2021-02-05 PROCEDURE — 85027 COMPLETE CBC AUTOMATED: CPT

## 2021-02-05 PROCEDURE — 36415 COLL VENOUS BLD VENIPUNCTURE: CPT

## 2021-02-05 PROCEDURE — 80053 COMPREHEN METABOLIC PANEL: CPT

## 2021-02-09 ENCOUNTER — OFFICE VISIT (OUTPATIENT)
Dept: PRIMARY CARE CLINIC | Age: 66
End: 2021-02-09
Payer: COMMERCIAL

## 2021-02-09 VITALS
BODY MASS INDEX: 26.29 KG/M2 | TEMPERATURE: 97.1 F | HEART RATE: 79 BPM | OXYGEN SATURATION: 99 % | DIASTOLIC BLOOD PRESSURE: 80 MMHG | RESPIRATION RATE: 18 BRPM | HEIGHT: 64 IN | WEIGHT: 154 LBS | SYSTOLIC BLOOD PRESSURE: 138 MMHG

## 2021-02-09 DIAGNOSIS — R73.03 PREDIABETES: ICD-10-CM

## 2021-02-09 DIAGNOSIS — I10 HYPERTENSION, ESSENTIAL: Primary | ICD-10-CM

## 2021-02-09 DIAGNOSIS — L81.9 PIGMENTED SKIN LESIONS: ICD-10-CM

## 2021-02-09 DIAGNOSIS — E78.00 PURE HYPERCHOLESTEROLEMIA: ICD-10-CM

## 2021-02-09 DIAGNOSIS — E55.9 VITAMIN D DEFICIENCY: ICD-10-CM

## 2021-02-09 DIAGNOSIS — F17.210 CIGARETTE NICOTINE DEPENDENCE WITHOUT COMPLICATION: ICD-10-CM

## 2021-02-09 DIAGNOSIS — L90.0 LICHEN SCLEROSUS: ICD-10-CM

## 2021-02-09 LAB
BILIRUBIN, POC: NORMAL
BLOOD URINE, POC: NORMAL
CLARITY, POC: CLEAR
COLOR, POC: YELLOW
GLUCOSE URINE, POC: NORMAL
KETONES, POC: NORMAL
LEUKOCYTE EST, POC: NORMAL
NITRITE, POC: NORMAL
PH, POC: 5
PROTEIN, POC: NORMAL
SPECIFIC GRAVITY, POC: 1.02
UROBILINOGEN, POC: 0.2

## 2021-02-09 PROCEDURE — 81002 URINALYSIS NONAUTO W/O SCOPE: CPT | Performed by: INTERNAL MEDICINE

## 2021-02-09 PROCEDURE — 93000 ELECTROCARDIOGRAM COMPLETE: CPT | Performed by: INTERNAL MEDICINE

## 2021-02-09 PROCEDURE — 99215 OFFICE O/P EST HI 40 MIN: CPT | Performed by: INTERNAL MEDICINE

## 2021-02-09 RX ORDER — CLOBETASOL PROPIONATE 0.5 MG/G
CREAM TOPICAL
Qty: 30 G | Refills: 0 | Status: SHIPPED
Start: 2021-02-09 | End: 2022-04-04

## 2021-02-09 SDOH — ECONOMIC STABILITY: FOOD INSECURITY: WITHIN THE PAST 12 MONTHS, THE FOOD YOU BOUGHT JUST DIDN'T LAST AND YOU DIDN'T HAVE MONEY TO GET MORE.: NEVER TRUE

## 2021-02-09 SDOH — ECONOMIC STABILITY: FOOD INSECURITY: WITHIN THE PAST 12 MONTHS, YOU WORRIED THAT YOUR FOOD WOULD RUN OUT BEFORE YOU GOT MONEY TO BUY MORE.: NEVER TRUE

## 2021-02-09 SDOH — ECONOMIC STABILITY: TRANSPORTATION INSECURITY
IN THE PAST 12 MONTHS, HAS THE LACK OF TRANSPORTATION KEPT YOU FROM MEDICAL APPOINTMENTS OR FROM GETTING MEDICATIONS?: NOT ASKED

## 2021-02-09 ASSESSMENT — PATIENT HEALTH QUESTIONNAIRE - PHQ9
1. LITTLE INTEREST OR PLEASURE IN DOING THINGS: 0
SUM OF ALL RESPONSES TO PHQ9 QUESTIONS 1 & 2: 0
SUM OF ALL RESPONSES TO PHQ QUESTIONS 1-9: 0
SUM OF ALL RESPONSES TO PHQ QUESTIONS 1-9: 0

## 2021-02-09 NOTE — PROGRESS NOTES
masses, no hair or nail changes  Eyes:               no blurring, no diplopia, no eye pain, no glaucoma, no cataracts  ENT:                 no hearing loss, no tinnitus, no vertigo, no nosebleed, no nasal congestion, no rhinorrhea, no sore throat, no jaw pain, no hoarseness,  no bleeding    Neck:     no node tenderness , not rigid, no masses   Respiratory:           no cough, no sputum, no coughing blood, no pleuritic , no chest pain, no dyspnea,  no wheezing  Cardiovascular:         no angina, no chest pain  No syncope, no pedal edema , no orthopnea, no PND, no palpitations, no claudication  Gastrointestinal  no nausea, no vomiting, no heartburn, no diarrhea, no constipation, no bloating, no abdominal pain, no rectal pain, no bleeding no hemorrhoids, no hernia. Genitourinary:            no urinary urgency, no frequency, no dysuria, no nocturia, no hesitancy, no  incontinence, no bleeding, no stones  Musculoskeletal:        no arthritis, no  arthralgia, no myalgia, no  weakness,  no morning stiffness, no joint swelling   Neurologic:                 no paralysis, no paresis, no  paresthesia, no seizures, no tremors, no headaches, no tumors , no stroke, no speech issues,  No incoordination, no head trauma, no memory loss/concentration  Hematologic:              no anemia, no abnormal bleeding / bruising, no fever, no chills, no night sweats, no wollen glands, no unexplained weight loss  Endocrine:        no heat or cold intolerance and no polyphagia, polydipsia,  or polyuria  Psych:  She denies anxiety or depression. Physical Exam:  Patient is an 72 y.o. female     Constitutional:  General Appearance: Well-appearing. Level of Distress: NAD. Ambulation: ambulating normally    Psychiatric:  Insight: good judgment: Mental status: normal mood and affect. Orientation: oriented to time place and person.   Memory: recent memory normal and remote memory normal.     Head: normocephalic and to nose intact and no tremor. Skin: Inspection and palpation: no rash, lesions, ulcer, induration, nodules, jaundice or abnormal nevi and good turgor. Nails: normal.     Back:  Thoracolumbar Appearance: normal curvature. Lab Results   Component Value Date    WBC 6.6 02/05/2021    HGB 15.4 02/05/2021    HCT 45.6 02/05/2021     02/05/2021    CHOL 216 (H) 02/05/2021    TRIG 86 02/05/2021    HDL 99 02/05/2021    ALT 24 02/05/2021    AST 22 02/05/2021    TSH 2.030 02/05/2021    INR 1.0 01/12/2019    LABA1C 5.1 05/07/2019      Lab Results   Component Value Date     02/05/2021    K 4.6 02/05/2021     02/05/2021    CO2 22 02/05/2021    BUN 14 02/05/2021    CREATININE 0.8 02/05/2021    GLUCOSE 133 (H) 02/05/2021    CALCIUM 9.7 02/05/2021    PROT 7.7 02/05/2021    LABALBU 4.8 02/05/2021    BILITOT 0.6 02/05/2021    ALKPHOS 66 02/05/2021    AST 22 02/05/2021    ALT 24 02/05/2021    LABGLOM >60 02/05/2021    GFRAA >60 02/05/2021            Assessment:    Hypertension, essential, fair control on current meds. -     POCT Urinalysis no Micro  -     EKG 12 lead  -     Comprehensive Metabolic Panel; Future    Pure hypercholesterolemia, mild  -     Lipid Panel; Future    Prediabetes, with a current hemoglobin A1c of 5.6%. -     Hemoglobin A1C; Future    Vitamin D deficiency on replacement therapy    Lichen sclerosus of the vulva  -     clobetasol prop emollient base 0.05 % CREA; Apply to affected area twice daily as needed. Cigarette nicotine dependence without complication          Discussion Notes: Patient to continue her usual meds and supplements the same as listed on her med list.  She is strongly advised to quit smoking cigarettes and also she is encouraged to follow a low-cholesterol, low refined carbohydrate, heart healthy diet and regular exercise.   She is given a prescription for clobetasol for the lichen sclerosus on her vulva she will use it twice a day as needed and if is not clearing up in a couple of weeks she will go back to see her gynecologist for further evaluation and treatment. Otherwise she will return as needed or in 6 months for follow-up visit and repeat labs including a CMP, hemoglobin A1c, and lipid panel.

## 2021-04-27 DIAGNOSIS — I10 ESSENTIAL HYPERTENSION: ICD-10-CM

## 2021-04-27 RX ORDER — LISINOPRIL 10 MG/1
10 TABLET ORAL DAILY
Qty: 90 TABLET | Refills: 1 | Status: SHIPPED
Start: 2021-04-27 | End: 2021-08-19 | Stop reason: SDUPTHER

## 2021-07-16 RX ORDER — HYDROCHLOROTHIAZIDE 12.5 MG/1
12.5 CAPSULE, GELATIN COATED ORAL
Qty: 24 CAPSULE | Refills: 3 | Status: SHIPPED
Start: 2021-07-19 | End: 2022-03-28 | Stop reason: SDUPTHER

## 2021-07-16 RX ORDER — ATENOLOL 25 MG/1
TABLET ORAL
Qty: 30 TABLET | Refills: 5 | Status: SHIPPED
Start: 2021-07-16 | End: 2021-07-20 | Stop reason: SDUPTHER

## 2021-07-20 RX ORDER — ATENOLOL 25 MG/1
TABLET ORAL
Qty: 90 TABLET | Refills: 3 | Status: SHIPPED
Start: 2021-07-20 | End: 2021-08-19 | Stop reason: SDUPTHER

## 2021-07-27 RX ORDER — ROSUVASTATIN CALCIUM 5 MG/1
5 TABLET, COATED ORAL DAILY
Qty: 30 TABLET | Refills: 5 | Status: SHIPPED
Start: 2021-07-27 | End: 2022-01-19 | Stop reason: SDUPTHER

## 2021-07-27 RX ORDER — CLOBETASOL PROPIONATE 0.5 MG/G
CREAM TOPICAL 2 TIMES DAILY
Qty: 30 G | Refills: 3 | Status: SHIPPED
Start: 2021-07-27 | End: 2022-01-05

## 2021-08-17 ENCOUNTER — HOSPITAL ENCOUNTER (OUTPATIENT)
Age: 66
Discharge: HOME | End: 2021-08-17
Payer: COMMERCIAL

## 2021-08-17 DIAGNOSIS — E78.00 PURE HYPERCHOLESTEROLEMIA: ICD-10-CM

## 2021-08-17 DIAGNOSIS — I10 HYPERTENSION, ESSENTIAL: ICD-10-CM

## 2021-08-17 DIAGNOSIS — R73.03 PREDIABETES: ICD-10-CM

## 2021-08-17 LAB
ALBUMIN SERPL-MCNC: 4.2 G/DL (ref 3.5–5.2)
ALP BLD-CCNC: 70 U/L (ref 35–104)
ALT SERPL-CCNC: 27 U/L (ref 0–32)
ANION GAP SERPL CALCULATED.3IONS-SCNC: 14 MMOL/L (ref 7–16)
AST SERPL-CCNC: 24 U/L (ref 0–31)
BILIRUB SERPL-MCNC: 0.5 MG/DL (ref 0–1.2)
BUN BLDV-MCNC: 8 MG/DL (ref 6–23)
CALCIUM SERPL-MCNC: 9.2 MG/DL (ref 8.6–10.2)
CHLORIDE BLD-SCNC: 101 MMOL/L (ref 98–107)
CHOLESTEROL, TOTAL: 200 MG/DL (ref 0–199)
CO2: 21 MMOL/L (ref 22–29)
CREAT SERPL-MCNC: 0.7 MG/DL (ref 0.5–1)
GFR AFRICAN AMERICAN: >60
GFR NON-AFRICAN AMERICAN: >60 ML/MIN/1.73
GLUCOSE BLD-MCNC: 111 MG/DL (ref 74–99)
HBA1C MFR BLD: 5.6 % (ref 4–5.6)
HDLC SERPL-MCNC: 102 MG/DL
LDL CHOLESTEROL CALCULATED: 85 MG/DL (ref 0–99)
POTASSIUM SERPL-SCNC: 4.4 MMOL/L (ref 3.5–5)
SODIUM BLD-SCNC: 136 MMOL/L (ref 132–146)
TOTAL PROTEIN: 7 G/DL (ref 6.4–8.3)
TRIGL SERPL-MCNC: 66 MG/DL (ref 0–149)
VLDLC SERPL CALC-MCNC: 13 MG/DL

## 2021-08-17 PROCEDURE — 36415 COLL VENOUS BLD VENIPUNCTURE: CPT

## 2021-08-17 PROCEDURE — 80053 COMPREHEN METABOLIC PANEL: CPT

## 2021-08-17 PROCEDURE — 80061 LIPID PANEL: CPT

## 2021-08-17 PROCEDURE — 83036 HEMOGLOBIN GLYCOSYLATED A1C: CPT

## 2021-08-19 ENCOUNTER — OFFICE VISIT (OUTPATIENT)
Dept: PRIMARY CARE CLINIC | Age: 66
End: 2021-08-19
Payer: COMMERCIAL

## 2021-08-19 VITALS
SYSTOLIC BLOOD PRESSURE: 138 MMHG | RESPIRATION RATE: 18 BRPM | HEART RATE: 73 BPM | TEMPERATURE: 97.3 F | HEIGHT: 64 IN | BODY MASS INDEX: 26.29 KG/M2 | DIASTOLIC BLOOD PRESSURE: 88 MMHG | OXYGEN SATURATION: 97 % | WEIGHT: 154 LBS

## 2021-08-19 VITALS
HEART RATE: 73 BPM | TEMPERATURE: 97.3 F | SYSTOLIC BLOOD PRESSURE: 138 MMHG | HEIGHT: 64 IN | BODY MASS INDEX: 26.29 KG/M2 | WEIGHT: 154 LBS | RESPIRATION RATE: 18 BRPM | DIASTOLIC BLOOD PRESSURE: 88 MMHG | OXYGEN SATURATION: 97 %

## 2021-08-19 DIAGNOSIS — K57.30 SIGMOID DIVERTICULOSIS: ICD-10-CM

## 2021-08-19 DIAGNOSIS — G89.29 CHRONIC LOW BACK PAIN WITHOUT SCIATICA, UNSPECIFIED BACK PAIN LATERALITY: ICD-10-CM

## 2021-08-19 DIAGNOSIS — R73.03 PREDIABETES: ICD-10-CM

## 2021-08-19 DIAGNOSIS — E55.9 VITAMIN D DEFICIENCY: ICD-10-CM

## 2021-08-19 DIAGNOSIS — E78.00 PURE HYPERCHOLESTEROLEMIA: Primary | ICD-10-CM

## 2021-08-19 DIAGNOSIS — I10 ESSENTIAL HYPERTENSION: ICD-10-CM

## 2021-08-19 DIAGNOSIS — Z00.00 ROUTINE GENERAL MEDICAL EXAMINATION AT A HEALTH CARE FACILITY: Primary | ICD-10-CM

## 2021-08-19 DIAGNOSIS — F17.210 CIGARETTE NICOTINE DEPENDENCE WITHOUT COMPLICATION: ICD-10-CM

## 2021-08-19 DIAGNOSIS — M51.36 LUMBAR DEGENERATIVE DISC DISEASE: ICD-10-CM

## 2021-08-19 DIAGNOSIS — M54.50 CHRONIC LOW BACK PAIN WITHOUT SCIATICA, UNSPECIFIED BACK PAIN LATERALITY: ICD-10-CM

## 2021-08-19 PROCEDURE — G0438 PPPS, INITIAL VISIT: HCPCS | Performed by: INTERNAL MEDICINE

## 2021-08-19 PROCEDURE — 99214 OFFICE O/P EST MOD 30 MIN: CPT | Performed by: INTERNAL MEDICINE

## 2021-08-19 RX ORDER — LISINOPRIL 10 MG/1
10 TABLET ORAL DAILY
Qty: 90 TABLET | Refills: 3 | Status: SHIPPED
Start: 2021-08-19 | End: 2022-09-19 | Stop reason: SDUPTHER

## 2021-08-19 RX ORDER — ATENOLOL 25 MG/1
TABLET ORAL
Qty: 90 TABLET | Refills: 3 | Status: CANCELLED | OUTPATIENT
Start: 2021-08-19

## 2021-08-19 RX ORDER — ATENOLOL 25 MG/1
TABLET ORAL
Qty: 90 TABLET | Refills: 3 | Status: SHIPPED
Start: 2021-08-19 | End: 2022-09-12 | Stop reason: SDUPTHER

## 2021-08-19 RX ORDER — LISINOPRIL 10 MG/1
10 TABLET ORAL DAILY
Qty: 90 TABLET | Refills: 3 | Status: CANCELLED | OUTPATIENT
Start: 2021-08-19

## 2021-08-19 ASSESSMENT — PATIENT HEALTH QUESTIONNAIRE - PHQ9
SUM OF ALL RESPONSES TO PHQ QUESTIONS 1-9: 0
SUM OF ALL RESPONSES TO PHQ9 QUESTIONS 1 & 2: 0
1. LITTLE INTEREST OR PLEASURE IN DOING THINGS: 0
SUM OF ALL RESPONSES TO PHQ QUESTIONS 1-9: 0
2. FEELING DOWN, DEPRESSED OR HOPELESS: 0
SUM OF ALL RESPONSES TO PHQ QUESTIONS 1-9: 0

## 2021-08-19 ASSESSMENT — LIFESTYLE VARIABLES: HOW OFTEN DO YOU HAVE A DRINK CONTAINING ALCOHOL: 0

## 2021-08-19 NOTE — PROGRESS NOTES
Jersey Shonda  8/19/21     Chief Complaint   Patient presents with    Hypertension     6 months w labs         Allergies   Allergen Reactions    Penicillins     Keflex [Cephalexin]         Current Outpatient Medications   Medication Sig Dispense Refill    atenolol (TENORMIN) 25 MG tablet Take 1 tablet every day by oral route. 90 tablet 3    lisinopril (PRINIVIL;ZESTRIL) 10 MG tablet Take 1 tablet by mouth daily 90 tablet 3    rosuvastatin (CRESTOR) 5 MG tablet Take 1 tablet by mouth daily 30 tablet 5    clobetasol prop emollient base 0.05 % CREA Apply topically 2 times daily (Patient not taking: Reported on 8/19/2021) 30 g 3    hydroCHLOROthiazide (MICROZIDE) 12.5 MG capsule Take 1 capsule by mouth Twice a Week 24 capsule 3    clobetasol prop emollient base 0.05 % CREA Apply to affected area twice daily as needed. 30 g 0    Cholecalciferol (VITAMIN D) 50 MCG (2000 UT) CAPS capsule Take 2,000 Units by mouth daily       No current facility-administered medications for this visit. HPI: Patient comes in for routine follow-up visit. Currently she feels fairly well. Unfortunately she continues to smoke cigarettes. She remains on all her usual meds and supplements the same as listed on her med list, which was reviewed with her. Currently she denies any chest pain or shortness of breath. She has not had any flareups of her diverticulitis since she was hospitalized 2 years ago. Review of Systems: as per HPI      Physical Exam:    Patient is a 77 y.o. female. Patient appears to be in no distress. Breathing comfortably. Ambulates without assistance. HEENT: normal.  Neck supple, no adenopathy or bruits. Heart RR, no MGR. Lungs clear. Abd: normal  Ext: no edema. Peripheral pulses: normal.  No neurologic deficits noted.     Lab Results   Component Value Date    WBC 6.6 02/05/2021    HGB 15.4 02/05/2021    HCT 45.6 02/05/2021     02/05/2021    CHOL 200 (H) 08/17/2021    TRIG 66 08/17/2021  08/17/2021    ALT 27 08/17/2021    AST 24 08/17/2021    TSH 2.030 02/05/2021    INR 1.0 01/12/2019    LABA1C 5.6 08/17/2021      Lab Results   Component Value Date     08/17/2021    K 4.4 08/17/2021     08/17/2021    CO2 21 (L) 08/17/2021    BUN 8 08/17/2021    CREATININE 0.7 08/17/2021    GLUCOSE 111 (H) 08/17/2021    CALCIUM 9.2 08/17/2021    PROT 7.0 08/17/2021    LABALBU 4.2 08/17/2021    BILITOT 0.5 08/17/2021    ALKPHOS 70 08/17/2021    AST 24 08/17/2021    ALT 27 08/17/2021    LABGLOM >60 08/17/2021    GFRAA >60 08/17/2021            Assessment:    Pure hypercholesterolemia  -     CBC Auto Differential; Future  -     Comprehensive Metabolic Panel; Future  -     Lipid Panel; Future  -     TSH without Reflex; Future    Essential hypertension, fair control on current meds. -     atenolol (TENORMIN) 25 MG tablet; Take 1 tablet every day by oral route. -     lisinopril (PRINIVIL;ZESTRIL) 10 MG tablet; Take 1 tablet by mouth daily    Prediabetes, currently stable with a hemoglobin A1c of 5.6%. -     Hemoglobin A1C; Future    Cigarette nicotine dependence without complication    Sigmoid diverticulosis, with no recent episodes of diverticulitis. Vitamin D deficiency, on replacement therapy. -     Vitamin D 25 Hydroxy; Future    Lumbar degenerative disc disease, causing occasional low back pain. Discussion Notes: She will continue all her usual meds and supplements the same as listed on her med list.  She is encouraged to try to follow a low-sodium, low refined carbohydrate, low-cholesterol, heart healthy diet and regular exercise. Also she is strongly advised to quit smoking. She will follow-up with her gynecologist for her routine gynecologic care mammograms.   Return here as needed or in 6 months for her annual physical.

## 2021-08-20 NOTE — PROGRESS NOTES
Medicare Annual Wellness Visit  Name: Micah Avery Date: 2021   MRN: [id number] Sex: Female   Age: 77 y.o. Ethnicity: Non- / Non    : 1955 Race: White (non-)      Brody Nugent is here for Medicare AWV (initial)    Screenings for behavioral, psychosocial and functional/safety risks, and cognitive dysfunction are all negative except as indicated below. These results, as well as other patient data from the [address] form, are documented in Flowsheets linked to this Encounter. Allergies   Allergen Reactions    Penicillins     Keflex [Cephalexin]          Prior to Visit Medications    Medication Sig Taking? Authorizing Provider   rosuvastatin (CRESTOR) 5 MG tablet Take 1 tablet by mouth daily Yes Yudelka Shen MD   hydroCHLOROthiazide (MICROZIDE) 12.5 MG capsule Take 1 capsule by mouth Twice a Week Yes Yudelka Shen MD   clobetasol prop emollient base 0.05 % CREA Apply to affected area twice daily as needed. Yes Yudelka Shen MD   Cholecalciferol (VITAMIN D) 50 MCG (2000 UT) CAPS capsule Take 2,000 Units by mouth daily Yes Historical Provider, MD   atenolol (TENORMIN) 25 MG tablet Take 1 tablet every day by oral route.   Yudelka Shen MD   lisinopril (PRINIVIL;ZESTRIL) 10 MG tablet Take 1 tablet by mouth daily  Yudelka Shen MD   clobetasol prop emollient base 0.05 % CREA Apply topically 2 times daily  Patient not taking: Reported on 2021  Yudelka Shen MD         Past Medical History:   Diagnosis Date    Anxiety     Bronchitis     Recurring    Carotid atherosclerosis     Chronic back pain     Diarrhea     Diverticulitis 2003    2nd episode ; 3rd episode 2019    Erythrocytosis     Glucose intolerance 2003    Hyperlipidemia     Hypertension, essential 2005    IBS (irritable bowel syndrome)     Insomnia     Kidney stone     Nicotine dependence     Obesity     Vitamin D deficiency        Past Surgical History: Procedure Laterality Date    CHOLECYSTECTOMY      COLONOSCOPY  2018    Negative findings    BIN AND BSO  2003    Endometriosis         Family History   Problem Relation Age of Onset    Diabetes Mother     Heart Failure Mother          age 78    Heart Failure Father          age 78    Arthritis Maternal Grandmother        CareTeam (Including outside providers/suppliers regularly involved in providing care):   Patient Care Team:  Saira Shirley MD as PCP - General (Internal Medicine)  Saira Shirley MD as PCP - [address] [place], [place] [address]kasie Yeboah Provider    Wt Readings from Last 3 Encounters:   21 154 lb (69.9 kg)   21 154 lb (69.9 kg)   21 154 lb (69.9 kg)     Vitals:    21 1535   BP: 138/88   Pulse: 73   Resp: 18   Temp: 97.3 °F (36.3 °C)   SpO2: 97%   Weight: 154 lb (69.9 kg)   Height: 5' 4\" (1.626 m)     Body mass index is 26.43 kg/m². Based upon direct observation of the patient, evaluation of cognition reveals recent and remote memory intact. Patient's complete Health Risk Assessment and screening values have been reviewed and are found in Flowsheets. The following problems were reviewed today and where indicated follow up appointments were made and/or referrals ordered. Positive Risk Factor Screenings with Interventions:         Substance History:  Social History     Tobacco History     Smoking Status  Current Every Day Smoker Smoking Start Date  1973 Smoking Frequency  1 pack/day for 45 years (39 pk yrs) Smoking Tobacco Type  Cigarettes    Smokeless Tobacco Use  Never Used          Alcohol History     Alcohol Use Status  Yes Comment  Occasional          Drug Use     Drug Use Status  No          Sexual Activity     Sexually Active  Not Asked               Alcohol Screening:       A score of 8 or more is associated with harmful or hazardous drinking. A score of 13 or more in women, and 15 or more in men, is likely to indicate alcohol dependence.   Substance Abuse Interventions:  · She is strongly advised stop smoking. General Health and ACP:  General  In general, how would you say your health is?: Good  In the past 7 days, have you experienced any of the following? New or Increased Pain, New or Increased Fatigue, Loneliness, Social Isolation, Stress or Anger?: None of These  Do you get the social and emotional support that you need?: Yes  Do you have a Living Will?: (!) No  Advance Directives     Power of [facility] Will ACP-Advance Directive ACP-Power of     Not on File Not on File Not on File Not on File      General Health Risk Interventions:  · No additional health risk interventions. Health Habits/Nutrition:  Health Habits/Nutrition  Do you exercise for at least 20 minutes 2-3 times per week?: Yes  Have you lost any weight without trying in the past 3 months?: No  Do you eat only one meal per day?: No  Have you seen the dentist within the past year?: (!) No  Body mass index: (!) 26.43  Health Habits/Nutrition Interventions:  · She is advised to try to follow a heart healthy diet and exercise on a regular basis as tolerated. Hearing/Vision:  No exam data present  Hearing/Vision  Do you or your family notice any trouble with your hearing that hasn't been managed with hearing aids?: No  Do you have difficulty driving, watching TV, or doing any of your daily activities because of your eyesight?: No  Have you had an eye exam within the past year?: (!) No  Hearing/Vision Interventions:  · No current hearing or visual concerns.   She is advised to get an annual eye exam.      Personalized Preventive Plan   Current Health Maintenance Status  Immunization History   Administered Date(s) Administered    Influenza Vaccine, unspecified formulation 08/17/2018    Influenza Virus Vaccine 08/17/2018    Influenza, Quadv, adjuvanted, 65 yrs +, IM, PF (Fluad) 10/02/2020        Health Maintenance   Topic Date Due    Hepatitis C screen  Never done    COVID-19 Vaccine (1) Never done    DTaP/Tdap/Td vaccine (1 - Tdap) Never done    Low dose CT lung screening  Never done    DEXA (modify frequency per FRAX score)  Never done    Pneumococcal 65+ years Vaccine (2 of 2 - PPSV23) 06/19/2020    Shingles Vaccine (2 of 2) 04/06/2021    Flu vaccine (1) 09/01/2021    Breast cancer screen  01/23/2022    A1C test (Diabetic or Prediabetic)  08/17/2022    Lipid screen  08/17/2022    Potassium monitoring  08/17/2022    Creatinine monitoring  08/17/2022    Colon cancer screen colonoscopy  06/22/2028    Hepatitis A vaccine  Aged Out    Hepatitis B vaccine  Aged Out    Hib vaccine  Aged Out    Meningococcal (ACWY) vaccine  Aged Out     Recommendations for [facility] Due: see orders and patient instructions/AVS.  . Recommended screening schedule for the next 5-10 years is provided to the patient in written form: see Patient Linda Dorsey was seen today for medicare aw.     Diagnoses and all orders for this visit:    Routine general medical examination at a health care facility

## 2021-08-20 NOTE — PATIENT INSTRUCTIONS
Personalized Preventive Plan for Rosalinda Gibson - 8/19/2021  Medicare offers a range of preventive health benefits. Some of the tests and screenings are paid in full while other may be subject to a deductible, co-insurance, and/or copay. Some of these benefits include a comprehensive review of your medical history including lifestyle, illnesses that may run in your family, and various assessments and screenings as appropriate. After reviewing your medical record and screening and assessments performed today your provider may have ordered immunizations, labs, imaging, and/or referrals for you. A list of these orders (if applicable) as well as your Preventive Care list are included within your After Visit Summary for your review. Other Preventive Recommendations:    · A preventive eye exam performed by an eye specialist is recommended every 1-2 years to screen for glaucoma; cataracts, macular degeneration, and other eye disorders. · A preventive dental visit is recommended every 6 months. · Try to get at least 150 minutes of exercise per week or 10,000 steps per day on a pedometer . · Order or download the FREE \"Exercise & Physical Activity: Your Everyday Guide\" from [employer]. Call 6-427.545.9441 or search [employer] online. · You need 6253-1971 mg of calcium and 4424-5510 IU of vitamin D per day. It is possible to meet your calcium requirement with diet alone, but a vitamin D supplement is usually necessary to meet this goal.  · When exposed to the sun, use a sunscreen that protects against both UVA and UVB radiation with an SPF of 30 or greater. Reapply every 2 to 3 hours or after sweating, drying off with a towel, or swimming. · Always wear a seat belt when traveling in a car. Always wear a helmet when riding a bicycle or motorcycle.

## 2021-08-21 PROBLEM — M51.36 LUMBAR DEGENERATIVE DISC DISEASE: Status: ACTIVE | Noted: 2021-08-21

## 2021-08-21 PROBLEM — M51.369 LUMBAR DEGENERATIVE DISC DISEASE: Status: ACTIVE | Noted: 2021-08-21

## 2021-08-21 PROBLEM — K57.30 SIGMOID DIVERTICULOSIS: Status: ACTIVE | Noted: 2021-08-21

## 2021-12-09 ENCOUNTER — TELEPHONE (OUTPATIENT)
Dept: PRIMARY CARE CLINIC | Age: 66
End: 2021-12-09

## 2021-12-09 DIAGNOSIS — M54.42 BILATERAL LOW BACK PAIN WITH BILATERAL SCIATICA, UNSPECIFIED CHRONICITY: Primary | ICD-10-CM

## 2021-12-09 DIAGNOSIS — M54.41 BILATERAL LOW BACK PAIN WITH BILATERAL SCIATICA, UNSPECIFIED CHRONICITY: Primary | ICD-10-CM

## 2021-12-09 NOTE — TELEPHONE ENCOUNTER
----- Message from Marleniia Jaxon sent at 12/9/2021  2:44 PM EST -----  Subject: Referral Request    QUESTIONS   Reason for referral request? pt stated bilateral sciatica - on both sides   and she was seen before for right side now also on left side   Has the physician seen you for this condition before? Yes  Select a date? 2019-12-12  Select the Provider the patient wants to be referred to, if known (PCP or   Specialist)? Outside Physician - Gabriel Posada [address]   Preferred Specialist (if applicable)? Do you already have an appointment scheduled? No  Additional Information for Provider?   ---------------------------------------------------------------------------  --------------  CALL BACK INFO  What is the best way for the office to contact you? OK to leave message on   voicemail, OK to respond with electronic message via [employer] portal (only   for patients who have registered [employer] account)  Preferred Call Back Phone Number?  [phone]

## 2021-12-09 NOTE — TELEPHONE ENCOUNTER
I printed referral to Dr. Poornima Zuñiga with [facility] pain management. Please fax it to his office.

## 2021-12-30 ENCOUNTER — TELEPHONE (OUTPATIENT)
Dept: PRIMARY CARE CLINIC | Age: 66
End: 2021-12-30

## 2021-12-30 NOTE — TELEPHONE ENCOUNTER
----- Message from Brendon Navas sent at 12/30/2021  4:21 PM EST -----  Subject: Referral Request    QUESTIONS   Reason for referral request? pt is requesting for a referral for a   gastrologist doctors name is Dr. Zo Dior she would like to be informed   once referral is in place. please call pt to discuss. Has the physician seen you for this condition before? No   Preferred Specialist (if applicable)? Do you already have an appointment scheduled? No  Additional Information for Provider?   ---------------------------------------------------------------------------  --------------  CALL BACK INFO  What is the best way for the office to contact you? OK to leave message on   voicemail  Preferred Call Back Phone Number?  [phone]

## 2022-01-04 ENCOUNTER — OFFICE VISIT (OUTPATIENT)
Dept: PRIMARY CARE CLINIC | Age: 67
End: 2022-01-04
Payer: COMMERCIAL

## 2022-01-04 ENCOUNTER — HOSPITAL ENCOUNTER (OUTPATIENT)
Age: 67
Discharge: HOME | End: 2022-01-04
Payer: COMMERCIAL

## 2022-01-04 VITALS
HEART RATE: 76 BPM | SYSTOLIC BLOOD PRESSURE: 120 MMHG | RESPIRATION RATE: 18 BRPM | OXYGEN SATURATION: 96 % | DIASTOLIC BLOOD PRESSURE: 70 MMHG | BODY MASS INDEX: 25.44 KG/M2 | TEMPERATURE: 97.1 F | HEIGHT: 64 IN | WEIGHT: 149 LBS

## 2022-01-04 DIAGNOSIS — I10 HYPERTENSION, ESSENTIAL: ICD-10-CM

## 2022-01-04 DIAGNOSIS — R10.11 RIGHT UPPER QUADRANT ABDOMINAL PAIN: ICD-10-CM

## 2022-01-04 DIAGNOSIS — R10.11 RIGHT UPPER QUADRANT ABDOMINAL PAIN: Primary | ICD-10-CM

## 2022-01-04 LAB
ALBUMIN SERPL-MCNC: 4.3 G/DL (ref 3.5–5.2)
ALP BLD-CCNC: 70 U/L (ref 35–104)
ALT SERPL-CCNC: 19 U/L (ref 0–32)
ANION GAP SERPL CALCULATED.3IONS-SCNC: 11 MMOL/L (ref 7–16)
AST SERPL-CCNC: 18 U/L (ref 0–31)
BILIRUB SERPL-MCNC: 0.4 MG/DL (ref 0–1.2)
BUN BLDV-MCNC: 10 MG/DL (ref 6–23)
CALCIUM SERPL-MCNC: 9 MG/DL (ref 8.6–10.2)
CHLORIDE BLD-SCNC: 99 MMOL/L (ref 98–107)
CO2: 24 MMOL/L (ref 22–29)
CREAT SERPL-MCNC: 0.7 MG/DL (ref 0.5–1)
GFR AFRICAN AMERICAN: >60
GFR NON-AFRICAN AMERICAN: >60 ML/MIN/1.73
GLUCOSE BLD-MCNC: 122 MG/DL (ref 74–99)
HCT VFR BLD CALC: 43 % (ref 34–48)
HEMOGLOBIN: 14.8 G/DL (ref 11.5–15.5)
MCH RBC QN AUTO: 33.2 PG (ref 26–35)
MCHC RBC AUTO-ENTMCNC: 34.4 % (ref 32–34.5)
MCV RBC AUTO: 96.4 FL (ref 80–99.9)
PDW BLD-RTO: 12.8 FL (ref 11.5–15)
PLATELET # BLD: 243 E9/L (ref 130–450)
PMV BLD AUTO: 9.1 FL (ref 7–12)
POTASSIUM SERPL-SCNC: 4.3 MMOL/L (ref 3.5–5)
RBC # BLD: 4.46 E12/L (ref 3.5–5.5)
SODIUM BLD-SCNC: 134 MMOL/L (ref 132–146)
TOTAL PROTEIN: 6.7 G/DL (ref 6.4–8.3)
WBC # BLD: 7.1 E9/L (ref 4.5–11.5)

## 2022-01-04 PROCEDURE — 99213 OFFICE O/P EST LOW 20 MIN: CPT | Performed by: INTERNAL MEDICINE

## 2022-01-04 PROCEDURE — 85027 COMPLETE CBC AUTOMATED: CPT

## 2022-01-04 PROCEDURE — 36415 COLL VENOUS BLD VENIPUNCTURE: CPT

## 2022-01-04 PROCEDURE — 80053 COMPREHEN METABOLIC PANEL: CPT

## 2022-01-04 NOTE — PROGRESS NOTES
Sampson Crawford  1/4/22     Chief Complaint   Patient presents with    Referral - General     gastro         Allergies   Allergen Reactions    Penicillins     Keflex [Cephalexin]         Current Outpatient Medications   Medication Sig Dispense Refill    atenolol (TENORMIN) 25 MG tablet Take 1 tablet every day by oral route. 90 tablet 3    lisinopril (PRINIVIL;ZESTRIL) 10 MG tablet Take 1 tablet by mouth daily 90 tablet 3    rosuvastatin (CRESTOR) 5 MG tablet Take 1 tablet by mouth daily 30 tablet 5    hydroCHLOROthiazide (MICROZIDE) 12.5 MG capsule Take 1 capsule by mouth Twice a Week 24 capsule 3    clobetasol prop emollient base 0.05 % CREA Apply to affected area twice daily as needed. 30 g 0    Cholecalciferol (VITAMIN D) 50 MCG (2000 UT) CAPS capsule Take 2,000 Units by mouth daily       No current facility-administered medications for this visit. HPI: Patient comes in today, accompanied by her daughter. Currently she is complaining of intermittent pain in the right upper quadrant of her abdomen which radiates to her right mid back occasionally. Also she has had associated loose bowel movements. She has a history of diverticulitis in the past but that pain has been in her lower abdomen. She saw Dr. Marsha Wayne a few years ago. She has not had an endoscopic evaluation recently. She had her gallbladder removed many years ago. She denies any fever or chills. She denies any rectal bleeding. She denies any diminished appetite or weight loss. Review of Systems: as per HPI      Physical Exam:    Patient is a 77 y.o. female. Patient appears to be in no distress. Breathing comfortably. Ambulates without assistance. HEENT: normal.  Neck supple, no adenopathy or bruits. Heart RR, no MGR. Lungs clear. Abd: Soft, with mild tenderness over the right upper quadrant. No masses or organomegaly noted. Bowel sounds normal.  No hernias. Ext: no edema.  Peripheral pulses: normal.  No neurologic deficits noted. Assessment:    Kamla Elizabeth was seen today for referral - general.    Diagnoses and all orders for this visit:    Right upper quadrant abdominal pain, etiology uncertain. -     Comprehensive Metabolic Panel; Future  -     CBC; Future  -     External Referral To Gastroenterology    Hypertension, essential          Discussion Notes: We will send her for labs including a CMP and CBC will make further recommendations depending on results. Also will refer back to her gastroenterologist for further evaluation and treatment including probable endoscopic evaluation. Also she will need an ultrasound of her abdomen and probably a CAT scan but will defer that to her gastroenterologist unless it is going to be more than a week or 2 to get into see him which case we will go ahead and order the ultrasound sooner.

## 2022-01-05 ENCOUNTER — TELEPHONE (OUTPATIENT)
Dept: PRIMARY CARE CLINIC | Age: 67
End: 2022-01-05

## 2022-01-19 RX ORDER — ROSUVASTATIN CALCIUM 5 MG/1
5 TABLET, COATED ORAL DAILY
Qty: 30 TABLET | Refills: 5 | Status: SHIPPED
Start: 2022-01-19 | End: 2022-07-18 | Stop reason: SDUPTHER

## 2022-02-19 ENCOUNTER — APPOINTMENT (OUTPATIENT)
Dept: GENERAL RADIOLOGY | Age: 67
End: 2022-02-19
Payer: COMMERCIAL

## 2022-02-19 ENCOUNTER — APPOINTMENT (OUTPATIENT)
Dept: CT IMAGING | Age: 67
End: 2022-02-19
Payer: COMMERCIAL

## 2022-02-19 ENCOUNTER — HOSPITAL ENCOUNTER (EMERGENCY)
Age: 67
Discharge: HOME | End: 2022-02-19
Attending: STUDENT IN AN ORGANIZED HEALTH CARE EDUCATION/TRAINING PROGRAM
Payer: COMMERCIAL

## 2022-02-19 VITALS
SYSTOLIC BLOOD PRESSURE: 130 MMHG | HEART RATE: 74 BPM | OXYGEN SATURATION: 97 % | WEIGHT: 149 LBS | TEMPERATURE: 99 F | RESPIRATION RATE: 16 BRPM | DIASTOLIC BLOOD PRESSURE: 80 MMHG | HEIGHT: 64 IN | BODY MASS INDEX: 25.44 KG/M2

## 2022-02-19 DIAGNOSIS — R53.1 GENERALIZED WEAKNESS: Primary | ICD-10-CM

## 2022-02-19 LAB
ANION GAP SERPL CALCULATED.3IONS-SCNC: 11 MMOL/L (ref 7–16)
BACTERIA: NORMAL /HPF
BASOPHILS ABSOLUTE: 0.02 E9/L (ref 0–0.2)
BASOPHILS RELATIVE PERCENT: 0.4 % (ref 0–2)
BILIRUBIN URINE: NEGATIVE
BLOOD, URINE: NEGATIVE
BUN BLDV-MCNC: 8 MG/DL (ref 6–23)
CALCIUM SERPL-MCNC: 9 MG/DL (ref 8.6–10.2)
CHLORIDE BLD-SCNC: 104 MMOL/L (ref 98–107)
CLARITY: CLEAR
CO2: 23 MMOL/L (ref 22–29)
COLOR: YELLOW
CREAT SERPL-MCNC: 0.6 MG/DL (ref 0.5–1)
EOSINOPHILS ABSOLUTE: 0.07 E9/L (ref 0.05–0.5)
EOSINOPHILS RELATIVE PERCENT: 1.3 % (ref 0–6)
GFR AFRICAN AMERICAN: >60
GFR NON-AFRICAN AMERICAN: >60 ML/MIN/1.73
GLUCOSE BLD-MCNC: 120 MG/DL (ref 74–99)
GLUCOSE URINE: NEGATIVE MG/DL
HCT VFR BLD CALC: 40 % (ref 34–48)
HEMOGLOBIN: 13.9 G/DL (ref 11.5–15.5)
HYALINE CASTS: NORMAL /LPF (ref 0–2)
IMMATURE GRANULOCYTES #: 0.04 E9/L
IMMATURE GRANULOCYTES %: 0.7 % (ref 0–5)
INFLUENZA A BY PCR: NOT DETECTED
INFLUENZA B BY PCR: NOT DETECTED
KETONES, URINE: NEGATIVE MG/DL
LEUKOCYTE ESTERASE, URINE: NEGATIVE
LYMPHOCYTES ABSOLUTE: 1.22 E9/L (ref 1.5–4)
LYMPHOCYTES RELATIVE PERCENT: 22.3 % (ref 20–42)
MCH RBC QN AUTO: 33.9 PG (ref 26–35)
MCHC RBC AUTO-ENTMCNC: 34.8 % (ref 32–34.5)
MCV RBC AUTO: 97.6 FL (ref 80–99.9)
MONOCYTES ABSOLUTE: 0.39 E9/L (ref 0.1–0.95)
MONOCYTES RELATIVE PERCENT: 7.1 % (ref 2–12)
NEUTROPHILS ABSOLUTE: 3.73 E9/L (ref 1.8–7.3)
NEUTROPHILS RELATIVE PERCENT: 68.2 % (ref 43–80)
NITRITE, URINE: NEGATIVE
PDW BLD-RTO: 13.1 FL (ref 11.5–15)
PH UA: 7.5 (ref 5–9)
PLATELET # BLD: 197 E9/L (ref 130–450)
PMV BLD AUTO: 9.2 FL (ref 7–12)
POTASSIUM REFLEX MAGNESIUM: 4.2 MMOL/L (ref 3.5–5)
PROTEIN UA: NEGATIVE MG/DL
RBC # BLD: 4.1 E12/L (ref 3.5–5.5)
RBC UA: NORMAL /HPF (ref 0–2)
SARS-COV-2, NAAT: NOT DETECTED
SODIUM BLD-SCNC: 138 MMOL/L (ref 132–146)
SPECIFIC GRAVITY UA: 1.01 (ref 1–1.03)
TROPONIN, HIGH SENSITIVITY: 9 NG/L (ref 0–9)
UROBILINOGEN, URINE: 0.2 E.U./DL
WBC # BLD: 5.5 E9/L (ref 4.5–11.5)
WBC UA: NORMAL /HPF (ref 0–5)

## 2022-02-19 PROCEDURE — 93005 ELECTROCARDIOGRAM TRACING: CPT | Performed by: STUDENT IN AN ORGANIZED HEALTH CARE EDUCATION/TRAINING PROGRAM

## 2022-02-19 PROCEDURE — 85025 COMPLETE CBC W/AUTO DIFF WBC: CPT

## 2022-02-19 PROCEDURE — 74177 CT ABD & PELVIS W/CONTRAST: CPT

## 2022-02-19 PROCEDURE — 84484 ASSAY OF TROPONIN QUANT: CPT

## 2022-02-19 PROCEDURE — 81001 URINALYSIS AUTO W/SCOPE: CPT

## 2022-02-19 PROCEDURE — 2580000003 HC RX 258: Performed by: STUDENT IN AN ORGANIZED HEALTH CARE EDUCATION/TRAINING PROGRAM

## 2022-02-19 PROCEDURE — 87635 SARS-COV-2 COVID-19 AMP PRB: CPT

## 2022-02-19 PROCEDURE — 87502 INFLUENZA DNA AMP PROBE: CPT

## 2022-02-19 PROCEDURE — 96360 HYDRATION IV INFUSION INIT: CPT

## 2022-02-19 PROCEDURE — 71045 X-RAY EXAM CHEST 1 VIEW: CPT

## 2022-02-19 PROCEDURE — 6360000004 HC RX CONTRAST MEDICATION: Performed by: RADIOLOGY

## 2022-02-19 PROCEDURE — 99284 EMERGENCY DEPT VISIT MOD MDM: CPT

## 2022-02-19 PROCEDURE — 80048 BASIC METABOLIC PNL TOTAL CA: CPT

## 2022-02-19 RX ORDER — 0.9 % SODIUM CHLORIDE 0.9 %
1000 INTRAVENOUS SOLUTION INTRAVENOUS ONCE
Status: COMPLETED | OUTPATIENT
Start: 2022-02-19 | End: 2022-02-19

## 2022-02-19 RX ADMIN — SODIUM CHLORIDE 1000 ML: 9 INJECTION, SOLUTION INTRAVENOUS at 12:50

## 2022-02-19 RX ADMIN — IOPAMIDOL 75 ML: 755 INJECTION, SOLUTION INTRAVENOUS at 12:22

## 2022-02-19 ASSESSMENT — ENCOUNTER SYMPTOMS
VOMITING: 0
EYE ITCHING: 0
COLOR CHANGE: 0
ABDOMINAL DISTENTION: 0
RHINORRHEA: 0
NAUSEA: 0
WHEEZING: 0
BACK PAIN: 0
EYE PAIN: 0
TROUBLE SWALLOWING: 0
FACIAL SWELLING: 0
DIARRHEA: 1
PHOTOPHOBIA: 0
CHEST TIGHTNESS: 0
BLOOD IN STOOL: 0
EYE REDNESS: 0
SHORTNESS OF BREATH: 0
CONSTIPATION: 0

## 2022-02-19 ASSESSMENT — PAIN DESCRIPTION - LOCATION: LOCATION: ABDOMEN

## 2022-02-19 ASSESSMENT — PAIN DESCRIPTION - PAIN TYPE: TYPE: CHRONIC PAIN

## 2022-02-19 ASSESSMENT — PAIN SCALES - GENERAL: PAINLEVEL_OUTOF10: 3

## 2022-02-20 LAB
EKG ATRIAL RATE: 66 BPM
EKG P AXIS: 47 DEGREES
EKG P-R INTERVAL: 136 MS
EKG Q-T INTERVAL: 414 MS
EKG QRS DURATION: 64 MS
EKG QTC CALCULATION (BAZETT): 434 MS
EKG R AXIS: -7 DEGREES
EKG T AXIS: 13 DEGREES
EKG VENTRICULAR RATE: 66 BPM

## 2022-02-20 PROCEDURE — 93010 ELECTROCARDIOGRAM REPORT: CPT | Performed by: INTERNAL MEDICINE

## 2022-02-20 ASSESSMENT — ENCOUNTER SYMPTOMS: ABDOMINAL PAIN: 1

## 2022-03-28 RX ORDER — HYDROCHLOROTHIAZIDE 12.5 MG/1
12.5 CAPSULE, GELATIN COATED ORAL
Qty: 24 CAPSULE | Refills: 3 | Status: SHIPPED | OUTPATIENT
Start: 2022-03-28

## 2022-04-01 ENCOUNTER — HOSPITAL ENCOUNTER (OUTPATIENT)
Age: 67
Discharge: HOME | End: 2022-04-01
Payer: COMMERCIAL

## 2022-04-01 DIAGNOSIS — R73.03 PREDIABETES: ICD-10-CM

## 2022-04-01 DIAGNOSIS — E55.9 VITAMIN D DEFICIENCY: ICD-10-CM

## 2022-04-01 DIAGNOSIS — E78.00 PURE HYPERCHOLESTEROLEMIA: ICD-10-CM

## 2022-04-01 LAB
ALBUMIN SERPL-MCNC: 4.4 G/DL (ref 3.5–5.2)
ALP BLD-CCNC: 75 U/L (ref 35–104)
ALT SERPL-CCNC: 20 U/L (ref 0–32)
ANION GAP SERPL CALCULATED.3IONS-SCNC: 12 MMOL/L (ref 7–16)
AST SERPL-CCNC: 20 U/L (ref 0–31)
BASOPHILS ABSOLUTE: 0.04 E9/L (ref 0–0.2)
BASOPHILS RELATIVE PERCENT: 0.6 % (ref 0–2)
BILIRUB SERPL-MCNC: 0.6 MG/DL (ref 0–1.2)
BUN BLDV-MCNC: 13 MG/DL (ref 6–23)
CALCIUM SERPL-MCNC: 9.3 MG/DL (ref 8.6–10.2)
CHLORIDE BLD-SCNC: 103 MMOL/L (ref 98–107)
CHOLESTEROL, TOTAL: 203 MG/DL (ref 0–199)
CO2: 25 MMOL/L (ref 22–29)
CREAT SERPL-MCNC: 0.7 MG/DL (ref 0.5–1)
EOSINOPHILS ABSOLUTE: 0.14 E9/L (ref 0.05–0.5)
EOSINOPHILS RELATIVE PERCENT: 2.1 % (ref 0–6)
GFR AFRICAN AMERICAN: >60
GFR NON-AFRICAN AMERICAN: >60 ML/MIN/1.73
GLUCOSE BLD-MCNC: 114 MG/DL (ref 74–99)
HBA1C MFR BLD: 5.5 % (ref 4–5.6)
HCT VFR BLD CALC: 45.8 % (ref 34–48)
HDLC SERPL-MCNC: 94 MG/DL
HEMOGLOBIN: 15.1 G/DL (ref 11.5–15.5)
IMMATURE GRANULOCYTES #: 0.02 E9/L
IMMATURE GRANULOCYTES %: 0.3 % (ref 0–5)
LDL CHOLESTEROL CALCULATED: 95 MG/DL (ref 0–99)
LYMPHOCYTES ABSOLUTE: 1.33 E9/L (ref 1.5–4)
LYMPHOCYTES RELATIVE PERCENT: 20 % (ref 20–42)
MCH RBC QN AUTO: 32.7 PG (ref 26–35)
MCHC RBC AUTO-ENTMCNC: 33 % (ref 32–34.5)
MCV RBC AUTO: 99.1 FL (ref 80–99.9)
MONOCYTES ABSOLUTE: 0.43 E9/L (ref 0.1–0.95)
MONOCYTES RELATIVE PERCENT: 6.5 % (ref 2–12)
NEUTROPHILS ABSOLUTE: 4.7 E9/L (ref 1.8–7.3)
NEUTROPHILS RELATIVE PERCENT: 70.5 % (ref 43–80)
PDW BLD-RTO: 13 FL (ref 11.5–15)
PLATELET # BLD: 206 E9/L (ref 130–450)
PMV BLD AUTO: 9.1 FL (ref 7–12)
POTASSIUM SERPL-SCNC: 4.7 MMOL/L (ref 3.5–5)
RBC # BLD: 4.62 E12/L (ref 3.5–5.5)
SODIUM BLD-SCNC: 140 MMOL/L (ref 132–146)
TOTAL PROTEIN: 7.3 G/DL (ref 6.4–8.3)
TRIGL SERPL-MCNC: 68 MG/DL (ref 0–149)
TSH SERPL DL<=0.05 MIU/L-ACNC: 1.6 UIU/ML (ref 0.27–4.2)
VITAMIN D 25-HYDROXY: 48 NG/ML (ref 30–100)
VLDLC SERPL CALC-MCNC: 14 MG/DL
WBC # BLD: 6.7 E9/L (ref 4.5–11.5)

## 2022-04-01 PROCEDURE — 85025 COMPLETE CBC W/AUTO DIFF WBC: CPT

## 2022-04-01 PROCEDURE — 80053 COMPREHEN METABOLIC PANEL: CPT

## 2022-04-01 PROCEDURE — 84443 ASSAY THYROID STIM HORMONE: CPT

## 2022-04-01 PROCEDURE — 36415 COLL VENOUS BLD VENIPUNCTURE: CPT

## 2022-04-01 PROCEDURE — 82306 VITAMIN D 25 HYDROXY: CPT

## 2022-04-01 PROCEDURE — 83036 HEMOGLOBIN GLYCOSYLATED A1C: CPT

## 2022-04-01 PROCEDURE — 80061 LIPID PANEL: CPT

## 2022-04-04 ENCOUNTER — OFFICE VISIT (OUTPATIENT)
Dept: PRIMARY CARE CLINIC | Age: 67
End: 2022-04-04
Payer: COMMERCIAL

## 2022-04-04 VITALS
OXYGEN SATURATION: 98 % | BODY MASS INDEX: 25.78 KG/M2 | RESPIRATION RATE: 16 BRPM | WEIGHT: 151 LBS | TEMPERATURE: 96.7 F | DIASTOLIC BLOOD PRESSURE: 70 MMHG | SYSTOLIC BLOOD PRESSURE: 118 MMHG | HEIGHT: 64 IN | HEART RATE: 85 BPM

## 2022-04-04 DIAGNOSIS — Z86.010 HISTORY OF COLON POLYPS: ICD-10-CM

## 2022-04-04 DIAGNOSIS — F17.210 CIGARETTE NICOTINE DEPENDENCE WITHOUT COMPLICATION: ICD-10-CM

## 2022-04-04 DIAGNOSIS — I10 HYPERTENSION, ESSENTIAL: Primary | ICD-10-CM

## 2022-04-04 DIAGNOSIS — G89.29 CHRONIC MIDLINE LOW BACK PAIN WITHOUT SCIATICA: ICD-10-CM

## 2022-04-04 DIAGNOSIS — M51.36 LUMBAR DEGENERATIVE DISC DISEASE: ICD-10-CM

## 2022-04-04 DIAGNOSIS — E78.00 PURE HYPERCHOLESTEROLEMIA: ICD-10-CM

## 2022-04-04 DIAGNOSIS — E55.9 VITAMIN D DEFICIENCY: ICD-10-CM

## 2022-04-04 DIAGNOSIS — M54.50 CHRONIC MIDLINE LOW BACK PAIN WITHOUT SCIATICA: ICD-10-CM

## 2022-04-04 DIAGNOSIS — K57.30 SIGMOID DIVERTICULOSIS: ICD-10-CM

## 2022-04-04 DIAGNOSIS — R73.03 PREDIABETES: ICD-10-CM

## 2022-04-04 DIAGNOSIS — Z12.31 SCREENING MAMMOGRAM FOR BREAST CANCER: ICD-10-CM

## 2022-04-04 PROCEDURE — 99215 OFFICE O/P EST HI 40 MIN: CPT | Performed by: INTERNAL MEDICINE

## 2022-04-04 RX ORDER — DICYCLOMINE HCL 20 MG
TABLET ORAL
COMMUNITY
Start: 2022-03-28

## 2022-04-04 SDOH — ECONOMIC STABILITY: FOOD INSECURITY: WITHIN THE PAST 12 MONTHS, THE FOOD YOU BOUGHT JUST DIDN'T LAST AND YOU DIDN'T HAVE MONEY TO GET MORE.: NEVER TRUE

## 2022-04-04 SDOH — ECONOMIC STABILITY: FOOD INSECURITY: WITHIN THE PAST 12 MONTHS, YOU WORRIED THAT YOUR FOOD WOULD RUN OUT BEFORE YOU GOT MONEY TO BUY MORE.: NEVER TRUE

## 2022-04-04 ASSESSMENT — PATIENT HEALTH QUESTIONNAIRE - PHQ9
2. FEELING DOWN, DEPRESSED OR HOPELESS: 0
1. LITTLE INTEREST OR PLEASURE IN DOING THINGS: 0
SUM OF ALL RESPONSES TO PHQ9 QUESTIONS 1 & 2: 0
SUM OF ALL RESPONSES TO PHQ QUESTIONS 1-9: 0

## 2022-04-04 ASSESSMENT — SOCIAL DETERMINANTS OF HEALTH (SDOH): HOW HARD IS IT FOR YOU TO PAY FOR THE VERY BASICS LIKE FOOD, HOUSING, MEDICAL CARE, AND HEATING?: NOT HARD AT ALL

## 2022-04-04 NOTE — PROGRESS NOTES
Marco Antonio Singh  4/4/22     Chief Complaint   Patient presents with    Hyperlipidemia     physical        Allergies   Allergen Reactions    Penicillins     Keflex [Cephalexin]         Current Outpatient Medications   Medication Sig Dispense Refill    dicyclomine (BENTYL) 20 MG tablet TAKE 1 TABLET BY MOUTH TWICE A DAY AS NEEDED FOR PAIN OF THE ABODOMEN      hydroCHLOROthiazide (MICROZIDE) 12.5 MG capsule Take 1 capsule by mouth Twice a Week 24 capsule 3    rosuvastatin (CRESTOR) 5 MG tablet Take 1 tablet by mouth daily 30 tablet 5    atenolol (TENORMIN) 25 MG tablet Take 1 tablet every day by oral route. 90 tablet 3    lisinopril (PRINIVIL;ZESTRIL) 10 MG tablet Take 1 tablet by mouth daily 90 tablet 3    Cholecalciferol (VITAMIN D) 50 MCG (2000 UT) CAPS capsule Take 2,000 Units by mouth daily       No current facility-administered medications for this visit. HPI: Patient comes in for her annual physical.  She is now 77years of age. Over the past few months she has been having problems with abdominal discomfort and loose bowel movements. She is following up with her gastroenterologist.  She was diagnosed with C. difficile was treated with Flagyl and following that vancomycin. Her symptoms have improved although she still requires Bentyl 20 mg twice daily she is going to be following up with her gastroenterologist for those issues and she also had a colonoscopy done recently and several polyps were removed. .  She denies any chest pain or shortness of breath. She remains on all of her usual meds and supplements the same as listed on her med list, which was reviewed with her. She states she remains physically active and tries to follow a heart healthy diet. She follows up with her gynecologist for her routine gynecologic care.   She is due for mammogram.    Review of Systems    General:   no weight change, no malaise, no fatigue, no change in appetite, no sleep disturbance, no fever/chills, no night sweats. Skin:                no abnormal pigmentation, no rash, no scaling, no itching, no masses, no hair or nail changes  Eyes:               no blurring, no diplopia, no eye pain, no glaucoma, no cataracts  ENT:                 no hearing loss, no tinnitus, no vertigo, no nosebleed, no nasal congestion, no rhinorrhea, no sore throat, no jaw pain, no hoarseness,  no bleeding    Neck:     no node tenderness , not rigid, no masses   Respiratory:           no cough, no sputum, no coughing blood, no pleuritic , no chest pain, no dyspnea,  no wheezing  Cardiovascular:         no angina, no chest pain  No syncope, no pedal edema , no orthopnea, no PND, no palpitations, no claudication  Gastrointestinal as noted in the HPI. Genitourinary:            no urinary urgency, no frequency, no dysuria, no nocturia, no hesitancy, no  incontinence, no bleeding, no stones  Musculoskeletal:       episodic low back pain. Neurologic:                 no paralysis, no paresis, no  paresthesia, no seizures, no tremors, no headaches, no tumors , no stroke, no speech issues,  No incoordination, no head trauma, no memory loss/concentration  Hematologic:              no anemia, no abnormal bleeding / bruising, no fever, no chills, no night sweats, no wollen glands, no unexplained weight loss  Endocrine:        no heat or cold intolerance and no polyphagia, polydipsia,  or polyuria  Psych:  Denies anxiety or depression. Physical Exam:  Patient is an 77 y.o. female     Constitutional:  General Appearance: Well-appearing. Level of Distress: NAD. Ambulation: ambulating normally    Psychiatric:  Insight: good judgment: Mental status: normal mood and affect. Orientation: oriented to time place and person. Memory: recent memory normal and remote memory normal.     Head: normocephalic and atraumatic. Eyes:  Lids and Conjunctiva: Non-injected and no pallor. Pupils: PERRLA, Corneas: grossly intact.   EOMI, Lens: clear.  Sclerae: non-icteric. Vision: peripheral vision grossly intact and acuity grossly intact. ENMT:  Ears: no lesions on external ear. TMs clear and TM mobility normal.  Hearing: no hearing loss. Nose: No lesions on external nose, septal deviation sinus tenderness or nasal discharge and nares patent and nasal passages clear. Lips, Teeth and Gums:  no mouth or lip ulcers or bleeding gums and normal dentition. Oropharynx: no erythema or exudates and moist mucous membranes and tonsils not enlarged. Neck:  Neck supple, FROM, trachea midline, and no masses. Lymph nodes: no cervical LAD, supraclavicular LAD, axillary LAD, or inguinal LAD. Thyroid: non-tender and no enlargement. Lungs:  Respiratory effort, no dyspnea. Auscultation: no rales/crackles or rhonchi and breath sounds normal, good air movement and CTA except as noted. Cardiovascular: Apical impulse:not displaced. Heart: Auscultation: normal S1 and S2, no murmurs, rubs or gallops; and RRR. Neck vessels: no carotid bruits. Pulses including femoral/pedal: normal throughout. Abdomen: Bowel sounds: normal.  Inspection and Palpation: no tenderness, guarding, masses, rebound tenderness or CVA tenderness and soft and non-distended. Liver: non-tender and no hepatomegaly. Spleen: non-tender and no splenomegaly. Hernia: none palpable. Musculoskeletal: Motor Strength and Tone: normal tone and motor strength. Joints, Bones and Muscles: no malalignment, tenderness or bony abnormalities and normal movement of all extremities. Extremities: no cyanosis, edema, varicosities, or palpable cord. Neurologic:  Gait and Station: normal gait and station. Cranial nerves:grossly intact. No motor or sensory deficits noted. Skin: Inspection and palpation: no rash, lesions, ulcer, induration, nodules, jaundice or abnormal nevi and good turgor. Nails: normal.     Back:  Thoracolumbar Appearance: normal curvature.        Lab Results   Component Value Date    WBC 6.7 04/01/2022    HGB 15.1 04/01/2022    HCT 45.8 04/01/2022     04/01/2022    CHOL 203 (H) 04/01/2022    TRIG 68 04/01/2022    HDL 94 04/01/2022    ALT 20 04/01/2022    AST 20 04/01/2022    TSH 1.600 04/01/2022    INR 1.0 01/12/2019    LABA1C 5.5 04/01/2022      Lab Results   Component Value Date     04/01/2022    K 4.7 04/01/2022     04/01/2022    CO2 25 04/01/2022    BUN 13 04/01/2022    CREATININE 0.7 04/01/2022    GLUCOSE 114 (H) 04/01/2022    CALCIUM 9.3 04/01/2022    PROT 7.3 04/01/2022    LABALBU 4.4 04/01/2022    BILITOT 0.6 04/01/2022    ALKPHOS 75 04/01/2022    AST 20 04/01/2022    ALT 20 04/01/2022    LABGLOM >60 04/01/2022    GFRAA >60 04/01/2022            Assessment:    Primary hypertension, well controlled on current meds. Pure hypercholesterolemia, borderline control on rosuvastatin 5 mg daily. -     Comprehensive Metabolic Panel; Future  -     Lipid Panel; Future    Lumbar degenerative disc disease    Chronic midline low back pain without sciatica, followed by pain management as needed. Vitamin D deficiency    Prediabetes, currently with a hemoglobin A1c 5.5%. -     Comprehensive Metabolic Panel; Future  -     Lipid Panel; Future  -     Hemoglobin A1C; Future    Cigarette nicotine dependence without complication    Sigmoid diverticulosis, with no recent flareups of diverticulitis. History of colon polyps, followed with periodic colonoscopies by her gastroenterologist.    Screening mammogram for breast cancer  -     [facility] DIGITAL SCREEN BILATERAL PER PROTOCOL; Future          Discussion Notes: She will continue all her current meds and supplements the same as listed on her med list.  She is encouraged to try to maintain a low refined carbohydrate, low-cholesterol, low-sodium, heart healthy diet and regular exercise as tolerated. We will schedule her for a screening mammogram.  She will follow-up with her gastroenterologist as per his instructions. Otherwise return here as needed in 6 months for routine follow-up visit and labs and annual wellness visit.

## 2022-04-05 PROBLEM — M54.50 CHRONIC MIDLINE LOW BACK PAIN WITHOUT SCIATICA: Status: ACTIVE | Noted: 2022-04-05

## 2022-04-05 PROBLEM — Z86.0100 HISTORY OF COLON POLYPS: Status: ACTIVE | Noted: 2022-04-05

## 2022-04-05 PROBLEM — G89.29 CHRONIC MIDLINE LOW BACK PAIN WITHOUT SCIATICA: Status: ACTIVE | Noted: 2022-04-05

## 2022-04-05 PROBLEM — Z86.010 HISTORY OF COLON POLYPS: Status: ACTIVE | Noted: 2022-04-05

## 2022-04-05 NOTE — ED PROVIDER NOTES
Name: Shelley Conklin   MRN: [id number]     --------------------------------------------- History of Present Illness ---------------------------------------------  2/19/22, Time: 12:06 PM EST   Chief Complaint   Patient presents with    Dizziness    Fatigue      HPI    Shelley Conklin is a 77 y.o. female, with hx of IBS, diverticulosis, recent c-diff colitis infection (currently on oral vancomycin), who presents to the ED today for weakness and fatigue, which began 3 days ago. She has been having diarrhea, denies any blood in stool however. The patient describes this as constant and moderate in severity. Nothing makes it better or worse. The pt denies any associated fever, HA, n/v, chest pain, shortness of breath, abd pain, or  complaints. Allg: Penicillins and Keflex [cephalexin] PCP: Shasha Rockwell MD.    Meds: No current facility-administered medications for this encounter. Current Outpatient Medications:     rosuvastatin (CRESTOR) 5 MG tablet, Take 1 tablet by mouth daily, Disp: 30 tablet, Rfl: 5    atenolol (TENORMIN) 25 MG tablet, Take 1 tablet every day by oral route., Disp: 90 tablet, Rfl: 3    lisinopril (PRINIVIL;ZESTRIL) 10 MG tablet, Take 1 tablet by mouth daily, Disp: 90 tablet, Rfl: 3    hydroCHLOROthiazide (MICROZIDE) 12.5 MG capsule, Take 1 capsule by mouth Twice a Week, Disp: 24 capsule, Rfl: 3    clobetasol prop emollient base 0.05 % CREA, Apply to affected area twice daily as needed. , Disp: 30 g, Rfl: 0    Cholecalciferol (VITAMIN D) 50 MCG (2000 UT) CAPS capsule, Take 2,000 Units by mouth daily, Disp: , Rfl:      Review of Systems   Constitutional: Positive for fatigue. Negative for chills and fever. HENT: Negative for congestion, facial swelling, rhinorrhea and trouble swallowing. Eyes: Negative for photophobia, pain, redness and itching. Respiratory: Negative for chest tightness, shortness of breath and wheezing. Cardiovascular: Negative for chest pain and palpitations. Gastrointestinal: Positive for abdominal pain and diarrhea. Negative for abdominal distention, blood in stool, constipation, nausea and vomiting. Genitourinary: Negative for difficulty urinating, dysuria, hematuria, vaginal bleeding and vaginal discharge. Musculoskeletal: Negative for arthralgias, back pain, joint swelling and neck pain. Skin: Negative for color change, rash and wound. Neurological: Positive for weakness. Negative for syncope, light-headedness and numbness. Psychiatric/Behavioral: Negative for agitation, behavioral problems and confusion. Physical Exam  Constitutional:       General: She is not in acute distress. Appearance: Normal appearance. She is normal weight. She is not ill-appearing, toxic-appearing or diaphoretic. HENT:      Head: Normocephalic and atraumatic. Right Ear: External ear normal.      Left Ear: External ear normal.      Nose: Nose normal.      Mouth/Throat:      Pharynx: Oropharynx is clear. Eyes:      General:         Right eye: No discharge. Left eye: No discharge. Conjunctiva/sclera: Conjunctivae normal.      Pupils: Pupils are equal, round, and reactive to light. Cardiovascular:      Rate and Rhythm: Normal rate and regular rhythm. Pulses: Normal pulses. Pulmonary:      Effort: Pulmonary effort is normal. No respiratory distress. Breath sounds: No stridor. No wheezing. Abdominal:      General: Bowel sounds are normal. There is no distension. Palpations: Abdomen is soft. Tenderness: There is abdominal tenderness (mild, generalized). There is no guarding. Musculoskeletal:         General: No swelling or tenderness. Normal range of motion. Cervical back: Normal range of motion. Right lower leg: No edema. Left lower leg: No edema. Skin:     General: Skin is warm and dry. Capillary Refill: Capillary refill takes less than 2 seconds. Findings: No erythema or rash.    Neurological: General: No focal deficit present. Mental Status: She is alert and oriented to person, place, and time. Psychiatric:         Mood and Affect: Mood normal.         Behavior: Behavior normal.          Procedures     MDM  Number of Diagnoses or Management Options  Generalized weakness  Diagnosis management comments: Mrs. Gilliland 70-year-old female patient presents today for fatigue and diarrhea. She recently had C. difficile infection and is on p.o. Vanco.  She has history of IBS and diverticulosis and states she has had chronic diarrhea for a long time. She sees Dr. Gaviota Sierra, GI. On physical exam, patient is alert, no apparent distress. Vitals within normal limits. Abdomen mildly tender. Labwork, EKG, CXR and abd CT ordered. IV fluids given. Labwork was reassuring. COVID and influenza negative. CXR negative. CT negative for any acute abnormalities. At this time, no further workup necessary. Pt likely feeling fatigued from her c-diff infection, pt already on proper ABX. Spoke with pt and daughter about results. Pt was feeling improved after fluids. Recommended f/u with PCP and she was given return precautions. Pt and daughter amenable to plan. Pt was discharged home. EKG Interpretation  Interpreted by emergency department physician.    2/20/22  Time: 1052    Rate: 66  Axis: normal  WV: 136  QRS: 64  Qtc: 434  Rhythm: regular  Clinical Impression: NSR w/ sinus arrhythmia  Comparison to old EKG: none        ED Course as of 02/20/22 0137   Sat Feb 19, 2022   1334 States feeling a bit improved. [PW]      ED Course User Index  [PW] Ben Merrill DO           ED Course as of 02/20/22 4855   Sat Feb 19, 2022   1334 States feeling a bit improved.  [PW]      ED Course User Index  [PW] Ben Merrill DO       --------------------------------------------- PAST HISTORY ---------------------------------------------  Past Medical History:  has a past medical history of Anxiety, Bronchitis, Carotid atherosclerosis, Chronic back pain, Diarrhea, Diverticulitis, Erythrocytosis, Glucose intolerance, Hyperlipidemia, Hypertension, essential, IBS (irritable bowel syndrome), Insomnia, Kidney stone, Nicotine dependence, Obesity, and Vitamin D deficiency. Past Surgical History:  has a past surgical history that includes jermaine and bso (cervix removed) (2003); Colonoscopy (06/2018); and Cholecystectomy. Social History:  reports that she has been smoking cigarettes. She started smoking about 49 years ago. She has a 45.00 pack-year smoking history. She has never used smokeless tobacco. She reports current alcohol use. She reports that she does not use drugs. Family History: family history includes Arthritis in her maternal grandmother; Diabetes in her mother; Heart Failure in her father and mother. The patients home medications have been reviewed.     Allergies: Penicillins and Keflex [cephalexin]    -------------------------------------------------- RESULTS -------------------------------------------------  Labs:  Results for orders placed or performed during the hospital encounter of 02/19/22   RAPID INFLUENZA A/B ANTIGENS    Specimen: Nasopharyngeal   Result Value Ref Range    Influenza A by PCR Not Detected Not Detected    Influenza B by PCR Not Detected Not Detected   COVID-19, Rapid   Result Value Ref Range    SARS-CoV-2, NAAT Not Detected Not Detected   CBC with Auto Differential   Result Value Ref Range    WBC 5.5 4.5 - 11.5 E9/L    RBC 4.10 3.50 - 5.50 E12/L    Hemoglobin 13.9 11.5 - 15.5 g/dL    Hematocrit 40.0 34.0 - 48.0 %    MCV 97.6 80.0 - 99.9 fL    MCH 33.9 26.0 - 35.0 pg    MCHC 34.8 (H) 32.0 - 34.5 %    RDW 13.1 11.5 - 15.0 fL    Platelets 403 555 - 971 E9/L    MPV 9.2 7.0 - 12.0 fL    Neutrophils % 68.2 43.0 - 80.0 %    Immature Granulocytes % 0.7 0.0 - 5.0 %    Lymphocytes % 22.3 20.0 - 42.0 %    Monocytes % 7.1 2.0 - 12.0 %    Eosinophils % 1.3 0.0 - 6.0 %    Basophils % 0.4 0.0 - 2.0 %    Neutrophils Absolute 3.73 1.80 - 7.30 E9/L    Immature Granulocytes # 0.04 E9/L    Lymphocytes Absolute 1.22 (L) 1.50 - 4.00 E9/L    Monocytes Absolute 0.39 0.10 - 0.95 E9/L    Eosinophils Absolute 0.07 0.05 - 0.50 E9/L    Basophils Absolute 0.02 0.00 - 0.20 U0/R   Basic Metabolic Panel w/ Reflex to MG   Result Value Ref Range    Sodium 138 132 - 146 mmol/L    Potassium reflex Magnesium 4.2 3.5 - 5.0 mmol/L    Chloride 104 98 - 107 mmol/L    CO2 23 22 - 29 mmol/L    Anion Gap 11 7 - 16 mmol/L    Glucose 120 (H) 74 - 99 mg/dL    BUN 8 6 - 23 mg/dL    CREATININE 0.6 0.5 - 1.0 mg/dL    GFR Non-African American >60 >=60 mL/min/1.73    GFR African American >60     Calcium 9.0 8.6 - 10.2 mg/dL   Troponin   Result Value Ref Range    Troponin, High Sensitivity 9 0 - 9 ng/L   Urinalysis with Microscopic   Result Value Ref Range    Color, UA Yellow Straw/Yellow    Clarity, UA Clear Clear    Glucose, Ur Negative Negative mg/dL    Bilirubin Urine Negative Negative    Ketones, Urine Negative Negative mg/dL    Specific Gravity, UA 1.015 1.005 - 1.030    Blood, Urine Negative Negative    pH, UA 7.5 5.0 - 9.0    Protein, UA Negative Negative mg/dL    Urobilinogen, Urine 0.2 <2.0 E.U./dL    Nitrite, Urine Negative Negative    Leukocyte Esterase, Urine Negative Negative    Hyaline Casts, UA 0-2 0 - 2 /LPF    WBC, UA 1-3 0 - 5 /HPF    RBC, UA 1-3 0 - 2 /HPF    Bacteria, UA NONE SEEN None Seen /HPF   EKG 12 Lead   Result Value Ref Range    Ventricular Rate 66 BPM    Atrial Rate 66 BPM    P-R Interval 136 ms    QRS Duration 64 ms    Q-T Interval 414 ms    QTc Calculation (Bazett) 434 ms    P Axis 47 degrees    R Axis -7 degrees    T Axis 13 degrees       Radiology:  CT ABDOMEN PELVIS W IV CONTRAST Additional Contrast? None   Final Result   1. No acute intra-abdominal process detected. 2.  Prior cholecystectomy with unchanged prominence of the extrahepatic   common bile duct.       3.  Moderate sigmoid diverticulosis without evidence of diverticulitis. XR CHEST PORTABLE   Final Result   No acute cardiopulmonary disease.             ------------------------- NURSING NOTES AND VITALS REVIEWED ---------------------------  Date / Time Roomed:  2/19/2022 10:24 AM  ED Bed Assignment:  11/11    The nursing notes within the ED encounter and vital signs as below have been reviewed. /80   Pulse 74   Temp 99 °F (37.2 °C)   Resp 16   Ht 5' 4\" (1.626 m)   Wt 149 lb (67.6 kg)   SpO2 97%   BMI 25.58 kg/m²   Oxygen Saturation Interpretation: normal      ------------------------------------------ PROGRESS NOTES ------------------------------------------  2:49 PM EST  I have spoken with the patient and daughter and discussed todays results, in addition to providing specific details for the plan of care and counseling regarding the diagnosis and prognosis. Their questions are answered at this time and they are agreeable with the plan. I discussed at length with them reasons for immediate return here for re evaluation. They will followup with their PCP on Monday.      --------------------------------- ADDITIONAL PROVIDER NOTES ---------------------------------  At this time the patient is without objective evidence of an acute process requiring hospitalization or inpatient management. They have remained hemodynamically stable throughout their entire ED visit and are stable for discharge with outpatient follow-up. The plan has been discussed in detail and they are aware of the specific conditions for emergent return, as well as the importance of follow-up. Discharge Medication List as of 2/19/2022  3:01 PM          Diagnosis:  1. Generalized weakness        Disposition:  Patient's disposition: Discharge to home  Patient's condition is stable.        Chata Moore DO  Resident  02/20/22 7951 regular rate and rhythm

## 2022-05-10 ENCOUNTER — HOSPITAL ENCOUNTER (OUTPATIENT)
Dept: MAMMOGRAPHY | Age: 67
Discharge: HOME | End: 2022-05-12
Payer: COMMERCIAL

## 2022-05-10 VITALS — BODY MASS INDEX: 25.61 KG/M2 | WEIGHT: 150 LBS | HEIGHT: 64 IN

## 2022-05-10 DIAGNOSIS — Z12.31 SCREENING MAMMOGRAM FOR BREAST CANCER: ICD-10-CM

## 2022-05-10 PROCEDURE — 77063 BREAST TOMOSYNTHESIS BI: CPT

## 2022-07-05 ENCOUNTER — TELEPHONE (OUTPATIENT)
Dept: PRIMARY CARE CLINIC | Age: 67
End: 2022-07-05

## 2022-07-05 DIAGNOSIS — B37.31 VAGINAL CANDIDIASIS: Primary | ICD-10-CM

## 2022-07-05 RX ORDER — FLUCONAZOLE 150 MG/1
150 TABLET ORAL ONCE
Qty: 1 TABLET | Refills: 0 | Status: SHIPPED | OUTPATIENT
Start: 2022-07-05 | End: 2022-07-05

## 2022-07-05 NOTE — TELEPHONE ENCOUNTER
Pt calling she went to urgent care for cough,and congestion. She was dx with bronchitis and sinusitis. She was given levaquin,medrol dose kelsey,mucinex,albuterol inhaler.  She is now needing a diflucan for yeast infx please  send to [facility] on [address]

## 2022-07-12 ENCOUNTER — APPOINTMENT (OUTPATIENT)
Dept: CT IMAGING | Age: 67
End: 2022-07-12
Payer: COMMERCIAL

## 2022-07-12 ENCOUNTER — HOSPITAL ENCOUNTER (EMERGENCY)
Age: 67
Discharge: HOME | End: 2022-07-12
Attending: EMERGENCY MEDICINE
Payer: COMMERCIAL

## 2022-07-12 VITALS
HEART RATE: 77 BPM | HEIGHT: 64 IN | WEIGHT: 150 LBS | RESPIRATION RATE: 18 BRPM | BODY MASS INDEX: 25.61 KG/M2 | SYSTOLIC BLOOD PRESSURE: 119 MMHG | TEMPERATURE: 98.6 F | OXYGEN SATURATION: 99 % | DIASTOLIC BLOOD PRESSURE: 61 MMHG

## 2022-07-12 DIAGNOSIS — H93.13 TINNITUS OF BOTH EARS: ICD-10-CM

## 2022-07-12 DIAGNOSIS — J34.89 SINUS PAIN: Primary | ICD-10-CM

## 2022-07-12 PROCEDURE — 99284 EMERGENCY DEPT VISIT MOD MDM: CPT

## 2022-07-12 PROCEDURE — 70450 CT HEAD/BRAIN W/O DYE: CPT

## 2022-07-12 RX ORDER — PSEUDOEPHEDRINE HYDROCHLORIDE 30 MG/1
30 TABLET ORAL EVERY 4 HOURS PRN
Qty: 20 TABLET | Refills: 1 | Status: SHIPPED | OUTPATIENT
Start: 2022-07-12 | End: 2022-07-19

## 2022-07-12 RX ORDER — FLUTICASONE PROPIONATE 50 MCG
2 SPRAY, SUSPENSION (ML) NASAL DAILY
Qty: 16 G | Refills: 0 | Status: SHIPPED | OUTPATIENT
Start: 2022-07-12 | End: 2022-08-16

## 2022-07-12 RX ORDER — DEXAMETHASONE 4 MG/1
4 TABLET ORAL 2 TIMES DAILY WITH MEALS
Qty: 20 TABLET | Refills: 0 | Status: SHIPPED | OUTPATIENT
Start: 2022-07-12 | End: 2022-07-22

## 2022-07-12 ASSESSMENT — PAIN DESCRIPTION - ONSET: ONSET: SUDDEN

## 2022-07-12 ASSESSMENT — PAIN DESCRIPTION - PAIN TYPE
TYPE: ACUTE PAIN
TYPE: ACUTE PAIN

## 2022-07-12 ASSESSMENT — PAIN - FUNCTIONAL ASSESSMENT: PAIN_FUNCTIONAL_ASSESSMENT: 0-10

## 2022-07-12 ASSESSMENT — PAIN DESCRIPTION - DESCRIPTORS
DESCRIPTORS: PRESSURE
DESCRIPTORS: PRESSURE

## 2022-07-12 ASSESSMENT — PAIN SCALES - GENERAL
PAINLEVEL_OUTOF10: 8
PAINLEVEL_OUTOF10: 8

## 2022-07-12 ASSESSMENT — PAIN DESCRIPTION - ORIENTATION: ORIENTATION: LEFT

## 2022-07-12 ASSESSMENT — PAIN DESCRIPTION - LOCATION
LOCATION: EAR
LOCATION: EAR;FACE;HEAD

## 2022-07-12 ASSESSMENT — PAIN DESCRIPTION - FREQUENCY: FREQUENCY: CONTINUOUS

## 2022-07-12 NOTE — ED PROVIDER NOTES
HPI:  7/12/22,   Time: 4:32 PM EDT         Topher Kirk is a 79 y.o. female presenting to the ED for pressure by  Her eyes head also complains of dizziness. She has a whooshing sound in her left ear. She states has been ongoing for 3 years. She been taking Levaquin no better. She went to [facility] today and took her off Levaquin and send her to the emergency department for imaging. Patient also seen her PCP did not find anything on clinical exam., beginning several years ago. The complaint has been persistent, mild in severity, and worsened by standing, and head movement. She has had no fevers or chills. She has not followed up with ENT. She has been treated with steroids and other antibiotics. She describes the headache as an ache. It is 8 out of 10 pressure and has been continuous. .      ROS:   Pertinent positives and negatives are stated within HPI, all other systems reviewed and are negative.  --------------------------------------------- PAST HISTORY ---------------------------------------------  Past Medical History:  has a past medical history of Anxiety, Bronchitis, Carotid atherosclerosis, Chronic back pain, Diarrhea, Diverticulitis, Erythrocytosis, Glucose intolerance, Hyperlipidemia, Hypertension, essential, IBS (irritable bowel syndrome), Insomnia, Kidney stone, Nicotine dependence, Obesity, and Vitamin D deficiency. Past Surgical History:  has a past surgical history that includes Total abdominal hysterectomy w/ bilateral salpingoophorectomy (2003); Colonoscopy (06/2018); Cholecystectomy; and Hysterectomy. Social History:  reports that she has been smoking cigarettes. She started smoking about 49 years ago. She has a 45.00 pack-year smoking history. She has never used smokeless tobacco. She reports current alcohol use. She reports that she does not use drugs.     Family History: family history includes Arthritis in her maternal grandmother; Cancer in her father; Diabetes in her mother; Heart Failure in her father and mother. The patients home medications have been reviewed. Allergies: Penicillins and Keflex [cephalexin]    -------------------------------------------------- RESULTS -------------------------------------------------  All laboratory and radiology results have been personally reviewed by myself   LABS:  No results found for this visit on 07/12/22. RADIOLOGY:  Interpreted by Radiologist.  [address]   Final Result   No acute intracranial abnormality. RECOMMENDATIONS:   Unavailable             ------------------------- NURSING NOTES AND VITALS REVIEWED ---------------------------   The nursing notes within the ED encounter and vital signs as below have been reviewed. BP (P) 119/61   Pulse (P) 77   Temp (P) 98.6 °F (37 °C) (Oral)   Resp (P) 18   Ht 5' 4\" (1.626 m)   Wt 150 lb (68 kg)   SpO2 (P) 99%   BMI 25.75 kg/m²   Oxygen Saturation Interpretation: Normal      ---------------------------------------------------PHYSICAL EXAM--------------------------------------      Constitutional/General: Alert and oriented x3, well appearing, non toxic in NAD  Head: NC/AT, patient has tenderness to palpation of the frontal and maxillary sinuses bilaterally, TMs appear normal bilaterally. External auditory canals are normal without swelling or discharge. She has no tenderness to palpation to the pinna or tragus. Eyes: PERRL, EOMI  Mouth: Oropharynx clear, handling secretions, no trismus  Neck: Supple, full ROM, no meningeal signs  Pulmonary: Lungs clear to auscultation bilaterally, no wheezes, rales, or rhonchi. Not in respiratory distress  Cardiovascular:  Regular rate and rhythm, no murmurs, gallops, or rubs. 2+ distal pulses  Abdomen: Soft, non tender, non distended,   Extremities: Moves all extremities x 4.  Warm and well perfused  Skin: warm and dry without rash  Neurologic: GCS 15,  Psych: Normal Affect      ------------------------------ ED COURSE/MEDICAL DECISION MAKING----------------------  Medications - No data to display      Medical Decision Making:    Presents with a chronic sinusitis pain. CT scan of the sinuses were negative for sinus pathology. Patient will be given a Decadron Flonase and Sudafed. Highly recommend she follow-up with ear nose and throat. Counseling: The emergency provider has spoken with the patient and discussed todays results, in addition to providing specific details for the plan of care and counseling regarding the diagnosis and prognosis. Questions are answered at this time and they are agreeable with the plan.      --------------------------------- IMPRESSION AND DISPOSITION ---------------------------------    IMPRESSION  1. Sinus pain    2.  Tinnitus of both ears        DISPOSITION  Disposition: Discharge to home  Patient condition is stable                Melissa Null DO  07/12/22 [id number]

## 2022-07-12 NOTE — ED NOTES
Pt and daughter given discharge instructions with education on tinnitus, pt also given phone number to ENT and informed to follow up with her primary care physician.   Pt also aware of prescriptions awaiting at her assigned pharmacy     Josh Bourne RN  07/12/22 [id number]

## 2022-07-18 RX ORDER — ROSUVASTATIN CALCIUM 5 MG/1
5 TABLET, COATED ORAL DAILY
Qty: 30 TABLET | Refills: 5 | Status: SHIPPED | OUTPATIENT
Start: 2022-07-18

## 2022-08-16 ENCOUNTER — PROCEDURE VISIT (OUTPATIENT)
Dept: AUDIOLOGY | Age: 67
End: 2022-08-16
Payer: COMMERCIAL

## 2022-08-16 ENCOUNTER — OFFICE VISIT (OUTPATIENT)
Dept: ENT CLINIC | Age: 67
End: 2022-08-16
Payer: COMMERCIAL

## 2022-08-16 VITALS — HEIGHT: 64 IN | WEIGHT: 148 LBS | BODY MASS INDEX: 25.27 KG/M2

## 2022-08-16 DIAGNOSIS — R09.81 NASAL CONGESTION: ICD-10-CM

## 2022-08-16 DIAGNOSIS — J30.9 ALLERGIC RHINITIS, UNSPECIFIED SEASONALITY, UNSPECIFIED TRIGGER: ICD-10-CM

## 2022-08-16 DIAGNOSIS — Z01.818 PREOP TESTING: ICD-10-CM

## 2022-08-16 DIAGNOSIS — R42 DISEQUILIBRIUM: ICD-10-CM

## 2022-08-16 DIAGNOSIS — H93.8X2 SENSATION OF FULLNESS IN LEFT EAR: ICD-10-CM

## 2022-08-16 DIAGNOSIS — R09.82 POST-NASAL DRAINAGE: ICD-10-CM

## 2022-08-16 DIAGNOSIS — H81.312 VERTIGO, AURAL, LEFT: Primary | ICD-10-CM

## 2022-08-16 DIAGNOSIS — H93.13 TINNITUS OF BOTH EARS: ICD-10-CM

## 2022-08-16 DIAGNOSIS — H93.8X2 EAR PRESSURE, LEFT: ICD-10-CM

## 2022-08-16 DIAGNOSIS — H93.A2 PULSATILE TINNITUS OF LEFT EAR: Primary | ICD-10-CM

## 2022-08-16 PROCEDURE — 92567 TYMPANOMETRY: CPT | Performed by: AUDIOLOGIST

## 2022-08-16 PROCEDURE — 99204 OFFICE O/P NEW MOD 45 MIN: CPT | Performed by: NURSE PRACTITIONER

## 2022-08-16 PROCEDURE — 1123F ACP DISCUSS/DSCN MKR DOCD: CPT | Performed by: NURSE PRACTITIONER

## 2022-08-16 PROCEDURE — 92557 COMPREHENSIVE HEARING TEST: CPT | Performed by: AUDIOLOGIST

## 2022-08-16 RX ORDER — FLUTICASONE PROPIONATE 50 MCG
2 SPRAY, SUSPENSION (ML) NASAL DAILY
Qty: 16 G | Refills: 1 | Status: SHIPPED
Start: 2022-08-16 | End: 2022-09-07

## 2022-08-16 ASSESSMENT — ENCOUNTER SYMPTOMS
RHINORRHEA: 1
RESPIRATORY NEGATIVE: 1
STRIDOR: 0
SINUS PRESSURE: 1
SHORTNESS OF BREATH: 0
EYES NEGATIVE: 1

## 2022-08-16 NOTE — PROGRESS NOTES
This patient was referred for audiometric/tympanometric testing by NANO Duncan due to ear fullness and vertigo, left ear and bilateral tinnitus, per patient report. Audiometry using pure tone air and bone conduction testing revealed a mild-to-moderately-severe  sensorineural hearing loss, at 2000-8000Hz,  bilaterally. Reliability was good. Speech reception thresholds were in good agreement with the pure tone averages, bilaterally. Speech discrimination scores were 100%, bilaterally at [address] [place] [address]. Tympanometry revealed normal middle ear peak pressure and compliance, bilaterally. The results were reviewed with the patient. Recommendations for follow up will be made pending physician consult.     Detsiny Santos CCC/DARRON  Audiologist  [id number]  NPI#:  [id number]

## 2022-08-16 NOTE — PROGRESS NOTES
[id number] [facility] Otolaryngology  Dr. Robbie Parisi. AUBREY Rhodes Ms.Ed. New Consult       Patient Name:  Elmer Amado  :  1955     CHIEF C/O:    Chief Complaint   Patient presents with    New Patient     4-5 years ago felt as if she had fluid in her ears after being sick and feels constant head pressure. Tinnitus in bl ears. Dizziness. Blurred vision in the left ear from pressure. Feels like the left ear is draining in the into the neck which causes metal taste. Dizziness worsened by sitting up and stading up. States that she is no longer comfortable driving. Gotten worse in the last two months. HISTORY OBTAINED FROM:  patient    HISTORY OF PRESENT ILLNESS:       Ward Ragsdale is a 79y.o. year old female, here today for sinus congestion, tinnitus, dizziness. Symptoms began in approximately 2017 but states she has had significant worsening over the past 6 to 8 months. She complains of persistent issues with her balance with sinus pressure and blurred vision. She denies any room spinning vertigo. She denies any change in her symptoms caused by rapid head movements or positional changes. She does complain of mild lightheaded sensation at times when standing too rapidly. Patient does complain of persistent congestion with rhinorrhea and postnasal drainage with sinus pressure behind the eyes. She denies any previous sinus imaging or prior sinus surgery she has used over-the-counter antihistamines and Flonase in the past but is not consistent with these medications. She also complains of pulsatile tinnitus in both ears that is worse in the left ear. She also states that she has popping in the left ear that is worse at times with swallowing. She did have carotid ultrasounds performed in the past by Dr. Neal Wright revealed normal flow bilaterally. Patient states that her disequilibrium and blurred vision make her feel unsafe to drive.   She states it has been greater than 2 to 3 years since her most recent eye exam.  She denies any history of significant hearing loss. She denies any family history of hearing loss. She denies any persistent noise exposure. Past Medical History:   Diagnosis Date    Anxiety     Bronchitis     Recurring    Carotid atherosclerosis     Chronic back pain     Diarrhea     Diverticulitis 2003    2nd episode 2010; 3rd episode 2019    Erythrocytosis     Glucose intolerance 2003    Hyperlipidemia     Hypertension, essential 2005    IBS (irritable bowel syndrome)     Insomnia     Kidney stone     Nicotine dependence     Obesity     Vitamin D deficiency      Past Surgical History:   Procedure Laterality Date    CHOLECYSTECTOMY      COLONOSCOPY  06/2018    Negative findings    HYSTERECTOMY (CERVIX STATUS UNKNOWN)      BIN AND BSO (CERVIX REMOVED)  2003    Endometriosis       Current Outpatient Medications:     rosuvastatin (CRESTOR) 5 MG tablet, Take 1 tablet by mouth in the morning., Disp: 30 tablet, Rfl: 5    fluticasone (FLONASE) 50 MCG/ACT nasal spray, 2 sprays by Each Nostril route daily, Disp: 16 g, Rfl: 0    dicyclomine (BENTYL) 20 MG tablet, TAKE 1 TABLET BY MOUTH TWICE A DAY AS NEEDED FOR PAIN OF THE ABODOMEN, Disp: , Rfl:     hydroCHLOROthiazide (MICROZIDE) 12.5 MG capsule, Take 1 capsule by mouth Twice a Week, Disp: 24 capsule, Rfl: 3    atenolol (TENORMIN) 25 MG tablet, Take 1 tablet every day by oral route., Disp: 90 tablet, Rfl: 3    lisinopril (PRINIVIL;ZESTRIL) 10 MG tablet, Take 1 tablet by mouth daily, Disp: 90 tablet, Rfl: 3    Cholecalciferol (VITAMIN D) 50 MCG (2000 UT) CAPS capsule, Take 2,000 Units by mouth daily, Disp: , Rfl:   Penicillins and Keflex [cephalexin]  Social History     Tobacco Use    Smoking status: Every Day     Packs/day: 1.00     Years: 45.00     Pack years: 45.00     Types: Cigarettes     Start date: 1/13/1973    Smokeless tobacco: Never   Vaping Use    Vaping Use: Never used   Substance Use Topics    Alcohol use: Yes     Comment: Occasional    Drug use:  No Family History   Problem Relation Age of Onset    Diabetes Mother     Heart Failure Mother          age 78    Heart Failure Father          age 78    Cancer Father     Arthritis Maternal Grandmother         colon, lung, prostate, intestine       Review of Systems   Constitutional: Negative. Negative for activity change and appetite change. HENT:  Positive for congestion, ear pain (Fullness left ear), postnasal drip, rhinorrhea, sinus pressure and tinnitus. Negative for hearing loss. Eyes: Negative. Respiratory: Negative. Negative for shortness of breath and stridor. Cardiovascular: Negative. Negative for chest pain and palpitations. Endocrine: Negative. Musculoskeletal: Negative. Skin: Negative. Neurological:  Positive for dizziness. Hematological: Negative. Psychiatric/Behavioral: Negative. Ht 5' 4\" (1.626 m)   Wt 148 lb (67.1 kg)   BMI 25.40 kg/m²   Physical Exam  Constitutional:       Appearance: Normal appearance. HENT:      Head: Normocephalic. Right Ear: Tympanic membrane, ear canal and external ear normal. Decreased hearing noted. Left Ear: Tympanic membrane, ear canal and external ear normal. Decreased hearing noted. Nose: Nose normal. No rhinorrhea. Right Turbinates: Pale. Left Turbinates: Pale. Mouth/Throat:      Lips: Pink. Mouth: Mucous membranes are moist.     Eyes:      Conjunctiva/sclera: Conjunctivae normal.      Pupils: Pupils are equal, round, and reactive to light. Cardiovascular:      Rate and Rhythm: Normal rate and regular rhythm. Pulses: Normal pulses. Pulmonary:      Effort: Pulmonary effort is normal. No respiratory distress. Breath sounds: No stridor. Musculoskeletal:         General: Normal range of motion. Cervical back: Normal range of motion. No rigidity. No muscular tenderness. Skin:     General: Skin is warm and dry. Neurological:      General: No focal deficit present. Mental Status: She is alert and oriented to person, place, and time. Psychiatric:         Mood and Affect: Mood normal.         Behavior: Behavior normal.         Thought Content: Thought content normal.         Judgment: Judgment normal.           Audiogram and tympanogram reviewed with patient. Audiogram reveals 20 dB hearing loss in the right ear with 100% discrimination at 60 dB, 20 dB of hearing loss in the left ear with 100% discrimination at 60 dB. Audiogram is symmetrical. Tympanogram reveals type A curve in the right ear, with type A curve in the left ear. IMPRESSION/PLAN:    [place] hallpike:  LEFT: (negative)   RIGHT: (negative)    Epley was not performed on the bilaterally x none     Recheck was not done after 0 Nicole Lange was seen today for new patient. Diagnoses and all orders for this visit:    Pulsatile tinnitus of left ear  -     MRI IAC POSTERIOR FOSSA W WO CONTRAST; Future    Disequilibrium  -     MRI IAC POSTERIOR FOSSA W WO CONTRAST; Future    Preop testing  -     BUN & Creatinine; Future    Ear pressure, left    Allergic rhinitis, unspecified seasonality, unspecified trigger    Post-nasal drainage    Nasal congestion    Other orders  -     fluticasone (FLONASE) 50 MCG/ACT nasal spray; 2 sprays by Each Nostril route in the morning.  -     mupirocin (BACTROBAN) 2 % ointment; Mixed in nasal saline, 2 sprays each nostril twice daily for 14 days    Patient is seen and examined today for multiple complaints including sinus congestion, dizziness, and pulsatile tinnitus. [place]-Hallpike maneuver was performed on the patient and found to be negative bilaterally. Patient does however experience mild lightheadedness symptoms while sitting up from a lying position. It is explained to the patient that this is most likely a slight drop in her blood pressure in the form of orthostatic hypotension.   Due to her symptoms of pulsatile tinnitus, disequilibrium, and blurred vision patient undergo an MRI of the IAC posterior fossa with and without contrast for further evaluation of the internal auditory canals and surrounding areas, as well as the brain. Patient is encouraged to follow-up with her eye doctor for a thorough eye exam due to her complaint of blurred vision at this time. She will also be placed on Flonase, 2 sprays each nostril once daily for possible eustachian tube dysfunct as well as nasal congestion and postnasal drainage. Patient was also given Bactroban nasal spray, 2 sprays each nostril twice daily for 2 weeks for crusting and irritation of the left nasal sinus. She will follow-up in 4 to 6 weeks for reevaluation and results of her testing. She is instructed to call with any new or worsening symptoms prior to her next appointment.     Shyanne Weldon, MSN, FNP-C  [facility], Nose and Throat    The information contained in this note has been dictated using drug and medical speech recognition software and may contain errors

## 2022-08-16 NOTE — PATIENT INSTRUCTIONS
BACTROBAN NASAL SPRAY     Fill prescription for 22 grams of Bactroban ointment. Purchase a bottle of nasal saline ( Nasal, Ocean or generic)     In a coffee mug mix:  1/3 tube ointment in 44 or 45 ml bottle of saline  OR  2/3 tube ointment in 88 ml bottle of saline  Microwave 15 seconds  Stir and let solution cool  After the solution is cool, poor the liquid back into the empty saline bottle and shake well     Use 2 sprays in each nostril 2 times a day for 2 weeks.

## 2022-08-22 ENCOUNTER — HOSPITAL ENCOUNTER (EMERGENCY)
Age: 67
Discharge: LEFT BEFORE DISCHARGE | End: 2022-08-22

## 2022-08-22 VITALS
HEIGHT: 64 IN | BODY MASS INDEX: 25.27 KG/M2 | DIASTOLIC BLOOD PRESSURE: 77 MMHG | SYSTOLIC BLOOD PRESSURE: 120 MMHG | OXYGEN SATURATION: 99 % | WEIGHT: 148 LBS | RESPIRATION RATE: 14 BRPM | TEMPERATURE: 97.1 F | HEART RATE: 84 BPM

## 2022-08-22 ASSESSMENT — PAIN DESCRIPTION - ORIENTATION: ORIENTATION: MID;LOWER

## 2022-08-22 ASSESSMENT — PAIN DESCRIPTION - LOCATION: LOCATION: ABDOMEN

## 2022-08-22 ASSESSMENT — PAIN SCALES - GENERAL: PAINLEVEL_OUTOF10: 7

## 2022-08-22 ASSESSMENT — PAIN - FUNCTIONAL ASSESSMENT: PAIN_FUNCTIONAL_ASSESSMENT: 0-10

## 2022-08-25 ENCOUNTER — HOSPITAL ENCOUNTER (OUTPATIENT)
Age: 67
Discharge: HOME | End: 2022-08-25
Payer: COMMERCIAL

## 2022-08-25 ENCOUNTER — OFFICE VISIT (OUTPATIENT)
Dept: PRIMARY CARE CLINIC | Age: 67
End: 2022-08-25
Payer: MEDICARE

## 2022-08-25 VITALS
OXYGEN SATURATION: 98 % | RESPIRATION RATE: 16 BRPM | BODY MASS INDEX: 25.61 KG/M2 | SYSTOLIC BLOOD PRESSURE: 110 MMHG | DIASTOLIC BLOOD PRESSURE: 70 MMHG | HEART RATE: 89 BPM | TEMPERATURE: 97.5 F | HEIGHT: 64 IN | WEIGHT: 150 LBS

## 2022-08-25 DIAGNOSIS — M25.50 POLYARTHRALGIA: ICD-10-CM

## 2022-08-25 DIAGNOSIS — R73.03 PREDIABETES: ICD-10-CM

## 2022-08-25 DIAGNOSIS — E78.00 PURE HYPERCHOLESTEROLEMIA: ICD-10-CM

## 2022-08-25 DIAGNOSIS — K57.32 SIGMOID DIVERTICULITIS: Primary | ICD-10-CM

## 2022-08-25 DIAGNOSIS — R53.83 FATIGUE, UNSPECIFIED TYPE: ICD-10-CM

## 2022-08-25 DIAGNOSIS — E55.9 VITAMIN D DEFICIENCY: ICD-10-CM

## 2022-08-25 DIAGNOSIS — I10 HYPERTENSION, ESSENTIAL: ICD-10-CM

## 2022-08-25 DIAGNOSIS — F17.210 CIGARETTE NICOTINE DEPENDENCE WITHOUT COMPLICATION: ICD-10-CM

## 2022-08-25 LAB
ALBUMIN SERPL-MCNC: 3.7 G/DL (ref 3.5–5.2)
ALP BLD-CCNC: 51 U/L (ref 35–104)
ALT SERPL-CCNC: 29 U/L (ref 0–32)
ANION GAP SERPL CALCULATED.3IONS-SCNC: 11 MMOL/L (ref 7–16)
AST SERPL-CCNC: 25 U/L (ref 0–31)
BASOPHILS ABSOLUTE: 0.03 E9/L (ref 0–0.2)
BASOPHILS RELATIVE PERCENT: 0.4 % (ref 0–2)
BILIRUB SERPL-MCNC: 0.6 MG/DL (ref 0–1.2)
BUN BLDV-MCNC: 3 MG/DL (ref 6–23)
C-REACTIVE PROTEIN: 0.7 MG/DL (ref 0–0.4)
CALCIUM SERPL-MCNC: 8.6 MG/DL (ref 8.6–10.2)
CHLORIDE BLD-SCNC: 97 MMOL/L (ref 98–107)
CHOLESTEROL, TOTAL: 123 MG/DL (ref 0–199)
CO2: 24 MMOL/L (ref 22–29)
CREAT SERPL-MCNC: 0.7 MG/DL (ref 0.5–1)
EOSINOPHILS ABSOLUTE: 0.07 E9/L (ref 0.05–0.5)
EOSINOPHILS RELATIVE PERCENT: 0.9 % (ref 0–6)
GFR AFRICAN AMERICAN: >60
GFR NON-AFRICAN AMERICAN: >60 ML/MIN/1.73
GLUCOSE BLD-MCNC: 132 MG/DL (ref 74–99)
HBA1C MFR BLD: 5.6 % (ref 4–5.6)
HCT VFR BLD CALC: 37.8 % (ref 34–48)
HDLC SERPL-MCNC: 66 MG/DL
HEMOGLOBIN: 13.1 G/DL (ref 11.5–15.5)
IMMATURE GRANULOCYTES #: 0.02 E9/L
IMMATURE GRANULOCYTES %: 0.3 % (ref 0–5)
LDL CHOLESTEROL CALCULATED: 43 MG/DL (ref 0–99)
LYMPHOCYTES ABSOLUTE: 1.94 E9/L (ref 1.5–4)
LYMPHOCYTES RELATIVE PERCENT: 25 % (ref 20–42)
MCH RBC QN AUTO: 33.5 PG (ref 26–35)
MCHC RBC AUTO-ENTMCNC: 34.7 % (ref 32–34.5)
MCV RBC AUTO: 96.7 FL (ref 80–99.9)
MONOCYTES ABSOLUTE: 0.83 E9/L (ref 0.1–0.95)
MONOCYTES RELATIVE PERCENT: 10.7 % (ref 2–12)
NEUTROPHILS ABSOLUTE: 4.88 E9/L (ref 1.8–7.3)
NEUTROPHILS RELATIVE PERCENT: 62.7 % (ref 43–80)
PDW BLD-RTO: 14.1 FL (ref 11.5–15)
PLATELET # BLD: 207 E9/L (ref 130–450)
PMV BLD AUTO: 8.7 FL (ref 7–12)
POTASSIUM SERPL-SCNC: 3.2 MMOL/L (ref 3.5–5)
RBC # BLD: 3.91 E12/L (ref 3.5–5.5)
SEDIMENTATION RATE, ERYTHROCYTE: 27 MM/HR (ref 0–20)
SODIUM BLD-SCNC: 132 MMOL/L (ref 132–146)
TOTAL PROTEIN: 6.3 G/DL (ref 6.4–8.3)
TRIGL SERPL-MCNC: 70 MG/DL (ref 0–149)
TSH SERPL DL<=0.05 MIU/L-ACNC: 1.62 UIU/ML (ref 0.27–4.2)
VITAMIN B-12: 559 PG/ML (ref 211–946)
VITAMIN D 25-HYDROXY: 46 NG/ML (ref 30–100)
VLDLC SERPL CALC-MCNC: 14 MG/DL
WBC # BLD: 7.8 E9/L (ref 4.5–11.5)

## 2022-08-25 PROCEDURE — 36415 COLL VENOUS BLD VENIPUNCTURE: CPT

## 2022-08-25 PROCEDURE — 82306 VITAMIN D 25 HYDROXY: CPT

## 2022-08-25 PROCEDURE — 99214 OFFICE O/P EST MOD 30 MIN: CPT | Performed by: INTERNAL MEDICINE

## 2022-08-25 PROCEDURE — 80053 COMPREHEN METABOLIC PANEL: CPT

## 2022-08-25 PROCEDURE — 80061 LIPID PANEL: CPT

## 2022-08-25 PROCEDURE — 86038 ANTINUCLEAR ANTIBODIES: CPT

## 2022-08-25 PROCEDURE — 86140 C-REACTIVE PROTEIN: CPT

## 2022-08-25 PROCEDURE — 84443 ASSAY THYROID STIM HORMONE: CPT

## 2022-08-25 PROCEDURE — 85651 RBC SED RATE NONAUTOMATED: CPT

## 2022-08-25 PROCEDURE — 85025 COMPLETE CBC W/AUTO DIFF WBC: CPT

## 2022-08-25 PROCEDURE — 83036 HEMOGLOBIN GLYCOSYLATED A1C: CPT

## 2022-08-25 PROCEDURE — 1123F ACP DISCUSS/DSCN MKR DOCD: CPT | Performed by: INTERNAL MEDICINE

## 2022-08-25 PROCEDURE — 82607 VITAMIN B-12: CPT

## 2022-08-25 RX ORDER — CIPROFLOXACIN 500 MG/1
TABLET, FILM COATED ORAL
COMMUNITY
Start: 2022-08-22

## 2022-08-25 RX ORDER — METRONIDAZOLE 500 MG/1
TABLET ORAL
COMMUNITY
Start: 2022-08-22

## 2022-08-25 NOTE — PROGRESS NOTES
Farrah Goetz  8/25/22     Chief Complaint   Patient presents with    Abdominal Pain     Bloating diarrhea nausea    Joint Pain     Thumbs and knees        Allergies   Allergen Reactions    Penicillins     Keflex [Cephalexin]         Current Outpatient Medications   Medication Sig Dispense Refill    metroNIDAZOLE (FLAGYL) 500 MG tablet TAKE 1 TABLET BY MOUTH 3 TIMES A DAY FOR 10 DAYS (DR ONLY CALLED IN 21)      ciprofloxacin (CIPRO) 500 MG tablet       fluticasone (FLONASE) 50 MCG/ACT nasal spray 2 sprays by Each Nostril route in the morning. 16 g 1    mupirocin (BACTROBAN) 2 % ointment Mixed in nasal saline, 2 sprays each nostril twice daily for 14 days 1 each 1    rosuvastatin (CRESTOR) 5 MG tablet Take 1 tablet by mouth in the morning. 30 tablet 5    dicyclomine (BENTYL) 20 MG tablet TAKE 1 TABLET BY MOUTH TWICE A DAY AS NEEDED FOR PAIN OF THE ABODOMEN      hydroCHLOROthiazide (MICROZIDE) 12.5 MG capsule Take 1 capsule by mouth Twice a Week 24 capsule 3    atenolol (TENORMIN) 25 MG tablet Take 1 tablet every day by oral route. 90 tablet 3    lisinopril (PRINIVIL;ZESTRIL) 10 MG tablet Take 1 tablet by mouth daily 90 tablet 3    Cholecalciferol (VITAMIN D) 50 MCG (2000 UT) CAPS capsule Take 2,000 Units by mouth daily       No current facility-administered medications for this visit. HPI: Patient comes in complaining of lower abdominal discomfort and loose bowel movements for the past week. She states she has not felt well for the past few months. Currently she is being treated for sigmoid diverticulitis by her gastroenterologist and is currently on Cipro and Flagyl and her symptoms seem to be improving. She also complains of some nausea off and on. She also complains of generalized fatigue and achiness and arthralgias involving her knees and hands recently. The arthralgias seem to be worse in the morning. She denies any fever or chills.   She remains on her usual meds and supplements the same as listed on her med list, which was reviewed with her. Unfortunately she continues to smoke cigarettes. Review of Systems: as per HPI      Physical Exam:    Patient is a 79 y.o. female. Patient appears to be in no distress. Breathing comfortably. Ambulates without assistance. HEENT: normal.  Neck supple, no adenopathy or bruits. Heart RR, no MGR. Lungs clear. Abd: Soft with mild tenderness over the lower abdomen. No masses or organomegaly noted. Bowel sounds normal.  No hernias noted. Ext: no edema. Peripheral pulses: normal.  No neurologic deficits noted. Assessment:    Celia Armenta was seen today for abdominal pain and joint pain. Diagnoses and all orders for this visit:    Sigmoid diverticulitis    Polyarthralgia, etiology uncertain but she does have osteoarthritis and cannot rule out exacerbation due to current infectious process. -     C-Reactive Protein; Future  -     Sedimentation Rate; Future  -     LONA; Future    Fatigue, unspecified type  -     CBC with Auto Differential; Future  -     TSH; Future  -     Vitamin B12; Future    Hypertension, essential, well controlled on current meds. -     Comprehensive Metabolic Panel; Future  -     CBC with Auto Differential; Future  -     TSH; Future    Pure hypercholesterolemia  -     Lipid Panel; Future    Vitamin D deficiency, on replacement therapy. -     Vitamin D 25 Hydroxy; Future    History of prediabetes  -     Hemoglobin A1C; Future    Cigarette nicotine dependence without complication        Discussion Notes: She will continue her usual meds and supplements the same as listed on her med list.  She is strongly advised to quit smoking. We will get labs including a CBC, CMP, lipid panel, hemoglobin A1c, TSH, CRP, sed rate, and an LONA, and will make further recommendations depending on results. She will follow-up with ENT to complete her work-up for her vertigo. She may take Pepto-Bismol for upset stomach and nausea.   She will finish her course of Cipro and Flagyl for her diverticulitis and follow-up with her gastroenterologist as per his instructions.

## 2022-08-26 LAB — ANTI-NUCLEAR ANTIBODY (ANA): NEGATIVE

## 2022-08-29 ENCOUNTER — TELEPHONE (OUTPATIENT)
Dept: PRIMARY CARE CLINIC | Age: 67
End: 2022-08-29

## 2022-08-29 DIAGNOSIS — R11.0 NAUSEA: Primary | ICD-10-CM

## 2022-08-29 RX ORDER — ONDANSETRON 4 MG/1
4 TABLET, FILM COATED ORAL 3 TIMES DAILY PRN
Qty: 15 TABLET | Refills: 1 | Status: SHIPPED | OUTPATIENT
Start: 2022-08-29

## 2022-08-29 NOTE — TELEPHONE ENCOUNTER
Prescription for Zofran sent to her pharmacy. Let us know in a couple of days if she is not feeling any better.

## 2022-09-01 ENCOUNTER — HOSPITAL ENCOUNTER (OUTPATIENT)
Age: 67
Discharge: HOME | End: 2022-09-01
Payer: MEDICARE

## 2022-09-01 LAB
BUN BLDV-MCNC: 6 MG/DL (ref 6–23)
CREAT SERPL-MCNC: 0.7 MG/DL (ref 0.5–1)
GFR AFRICAN AMERICAN: >60
GFR NON-AFRICAN AMERICAN: >60 ML/MIN/1.73

## 2022-09-01 PROCEDURE — 84520 ASSAY OF UREA NITROGEN: CPT

## 2022-09-01 PROCEDURE — 36415 COLL VENOUS BLD VENIPUNCTURE: CPT

## 2022-09-01 PROCEDURE — 82565 ASSAY OF CREATININE: CPT

## 2022-09-03 ENCOUNTER — HOSPITAL ENCOUNTER (OUTPATIENT)
Dept: MRI IMAGING | Age: 67
Discharge: HOME | End: 2022-09-05
Payer: MEDICARE

## 2022-09-03 DIAGNOSIS — H93.A2 PULSATILE TINNITUS OF LEFT EAR: ICD-10-CM

## 2022-09-03 DIAGNOSIS — R42 DISEQUILIBRIUM: ICD-10-CM

## 2022-09-03 PROCEDURE — A9579 GAD-BASE MR CONTRAST NOS,1ML: HCPCS | Performed by: RADIOLOGY

## 2022-09-03 PROCEDURE — 70553 MRI BRAIN STEM W/O & W/DYE: CPT

## 2022-09-03 PROCEDURE — 6360000004 HC RX CONTRAST MEDICATION: Performed by: RADIOLOGY

## 2022-09-03 RX ADMIN — GADOTERIDOL 14 ML: 279.3 INJECTION, SOLUTION INTRAVENOUS at 13:51

## 2022-09-07 RX ORDER — FLUTICASONE PROPIONATE 50 MCG
2 SPRAY, SUSPENSION (ML) NASAL DAILY
Qty: 48 G | Refills: 1 | Status: SHIPPED | OUTPATIENT
Start: 2022-09-07

## 2022-09-12 DIAGNOSIS — I10 ESSENTIAL HYPERTENSION: ICD-10-CM

## 2022-09-12 RX ORDER — ATENOLOL 25 MG/1
TABLET ORAL
Qty: 90 TABLET | Refills: 3 | Status: SHIPPED | OUTPATIENT
Start: 2022-09-12

## 2022-09-19 ENCOUNTER — OFFICE VISIT (OUTPATIENT)
Dept: ENT CLINIC | Age: 67
End: 2022-09-19
Payer: MEDICARE

## 2022-09-19 VITALS
HEART RATE: 80 BPM | BODY MASS INDEX: 25.61 KG/M2 | HEIGHT: 64 IN | WEIGHT: 150 LBS | SYSTOLIC BLOOD PRESSURE: 107 MMHG | DIASTOLIC BLOOD PRESSURE: 56 MMHG

## 2022-09-19 DIAGNOSIS — H93.A2 PULSATILE TINNITUS OF LEFT EAR: Primary | ICD-10-CM

## 2022-09-19 DIAGNOSIS — R09.81 NASAL CONGESTION: ICD-10-CM

## 2022-09-19 DIAGNOSIS — R42 DISEQUILIBRIUM: ICD-10-CM

## 2022-09-19 DIAGNOSIS — R09.82 POST-NASAL DRAINAGE: ICD-10-CM

## 2022-09-19 DIAGNOSIS — I10 ESSENTIAL HYPERTENSION: ICD-10-CM

## 2022-09-19 PROCEDURE — 1123F ACP DISCUSS/DSCN MKR DOCD: CPT | Performed by: NURSE PRACTITIONER

## 2022-09-19 PROCEDURE — 99213 OFFICE O/P EST LOW 20 MIN: CPT | Performed by: NURSE PRACTITIONER

## 2022-09-19 RX ORDER — AZELASTINE 1 MG/ML
1-2 SPRAY, METERED NASAL 2 TIMES DAILY PRN
Qty: 30 ML | Refills: 1 | Status: SHIPPED | OUTPATIENT
Start: 2022-09-19

## 2022-09-19 RX ORDER — LISINOPRIL 10 MG/1
10 TABLET ORAL DAILY
Qty: 90 TABLET | Refills: 3 | Status: SHIPPED | OUTPATIENT
Start: 2022-09-19

## 2022-09-19 ASSESSMENT — ENCOUNTER SYMPTOMS
RHINORRHEA: 0
EYES NEGATIVE: 1
SINUS PRESSURE: 0
SHORTNESS OF BREATH: 0
RESPIRATORY NEGATIVE: 1
STRIDOR: 0

## 2022-09-19 NOTE — PROGRESS NOTES
[facility]  Dr. Liliana Tirado. Abran Draper. Ms.Ed        Patient Name:  Chilango Tom  :  1955     CHIEF C/O:    Chief Complaint   Patient presents with    Follow-up     F/U MRI tinnitus       HISTORY OBTAINED FROM:  patient    HISTORY OF PRESENT ILLNESS:       Beatrice Piña is a 79y.o. year old female, here today for follow up of MRI results, tinnitus, sinus pressure. Last seen 1 month ago  MRI negative of IAC  Was using flonase with little changes to symptoms  Continues to note pulsatile whooshing sound in both ears but does state that it has become less noticeable. She does state that she is having less issues with her balance and equilibrium she denies any new or recent falls since her last appointment. She does complain of continued postnasal drainage And mild congestion sensation. she did complete Bactroban ointment to the left nostril for significant amount of crusting which she states has improved And mild congestion sensation.       Past Medical History:   Diagnosis Date    Anxiety     Bronchitis     Recurring    Carotid atherosclerosis     Chronic back pain     Diarrhea     Diverticulitis 2003    2nd episode ; 3rd episode     Erythrocytosis     Glucose intolerance 2003    Hyperlipidemia     Hypertension, essential     IBS (irritable bowel syndrome)     Insomnia     Kidney stone     Nicotine dependence     Obesity     Vitamin D deficiency      Past Surgical History:   Procedure Laterality Date    CHOLECYSTECTOMY      COLONOSCOPY  2018    Negative findings    HYSTERECTOMY (CERVIX STATUS UNKNOWN)      BIN AND BSO (CERVIX REMOVED)      Endometriosis       Current Outpatient Medications:     lisinopril (PRINIVIL;ZESTRIL) 10 MG tablet, Take 1 tablet by mouth daily, Disp: 90 tablet, Rfl: 3    atenolol (TENORMIN) 25 MG tablet, Take 1 tablet every day by oral route., Disp: 90 tablet, Rfl: 3    fluticasone (FLONASE) 50 MCG/ACT nasal spray, 2 SPRAYS BY [facility] NOSTRIL ROUTE IN THE MORNING., Disp: 48 g, Rfl: 1    ondansetron (ZOFRAN) 4 MG tablet, Take 1 tablet by mouth 3 times daily as needed for Nausea or Vomiting, Disp: 15 tablet, Rfl: 1    metroNIDAZOLE (FLAGYL) 500 MG tablet, TAKE 1 TABLET BY MOUTH 3 TIMES A DAY FOR 10 DAYS (DR ONLY CALLED IN ), Disp: , Rfl:     ciprofloxacin (CIPRO) 500 MG tablet, , Disp: , Rfl:     mupirocin (BACTROBAN) 2 % ointment, Mixed in nasal saline, 2 sprays each nostril twice daily for 14 days, Disp: 1 each, Rfl: 1    rosuvastatin (CRESTOR) 5 MG tablet, Take 1 tablet by mouth in the morning., Disp: 30 tablet, Rfl: 5    dicyclomine (BENTYL) 20 MG tablet, TAKE 1 TABLET BY MOUTH TWICE A DAY AS NEEDED FOR PAIN OF THE ABODOMEN, Disp: , Rfl:     hydroCHLOROthiazide (MICROZIDE) 12.5 MG capsule, Take 1 capsule by mouth Twice a Week, Disp: 24 capsule, Rfl: 3    Cholecalciferol (VITAMIN D) 50 MCG (2000 UT) CAPS capsule, Take 2,000 Units by mouth daily, Disp: , Rfl:   Penicillins and Keflex [cephalexin]  Social History     Tobacco Use    Smoking status: Every Day     Packs/day: 1.00     Years: 45.00     Pack years: 45.00     Types: Cigarettes     Start date: 1973    Smokeless tobacco: Never   Vaping Use    Vaping Use: Never used   Substance Use Topics    Alcohol use: Yes     Comment: Occasional    Drug use: No     Family History   Problem Relation Age of Onset    Diabetes Mother     Heart Failure Mother          age 78    Heart Failure Father          age 78    Cancer Father     Arthritis Maternal Grandmother         colon, lung, prostate, intestine       Review of Systems   Constitutional: Negative. Negative for activity change and appetite change. HENT:  Positive for congestion, postnasal drip and tinnitus. Negative for ear pain, hearing loss, rhinorrhea and sinus pressure. Eyes: Negative. Respiratory: Negative. Negative for shortness of breath and stridor. Cardiovascular: Negative.   Negative for chest pain and palpitations. Endocrine: Negative. Musculoskeletal: Negative. Skin: Negative. Neurological:  Positive for dizziness (Decreased disequilibrium). Hematological: Negative. Psychiatric/Behavioral: Negative. BP (!) 107/56   Pulse 80   Ht 5' 4\" (1.626 m)   Wt 150 lb (68 kg)   BMI 25.75 kg/m²   Physical Exam  Constitutional:       Appearance: Normal appearance. HENT:      Head: Normocephalic. Right Ear: Tympanic membrane, ear canal and external ear normal. Decreased hearing noted. Left Ear: Tympanic membrane, ear canal and external ear normal. Decreased hearing noted. Nose: Nose normal. No rhinorrhea. Right Turbinates: Pale. Left Turbinates: Pale. Mouth/Throat:      Lips: Pink. Mouth: Mucous membranes are moist.     Eyes:      Conjunctiva/sclera: Conjunctivae normal.      Pupils: Pupils are equal, round, and reactive to light. Cardiovascular:      Rate and Rhythm: Normal rate and regular rhythm. Pulses: Normal pulses. Pulmonary:      Effort: Pulmonary effort is normal. No respiratory distress. Breath sounds: No stridor. Musculoskeletal:         General: Normal range of motion. Cervical back: Normal range of motion. No rigidity. No muscular tenderness. Skin:     General: Skin is warm and dry. Neurological:      General: No focal deficit present. Mental Status: She is alert and oriented to person, place, and time. Psychiatric:         Mood and Affect: Mood normal.         Behavior: Behavior normal.         Thought Content: Thought content normal.         Judgment: Judgment normal.     MRI IAC:    Impression   1. No acute intracranial abnormality. No acute infarct. 2. Minimal global parenchymal volume loss with minimal chronic microvascular   ischemic changes. 3. No abnormality seen within the cerebellopontine angle cisterns or internal   auditory canals. 4. Trace right mastoid effusion.

## 2022-09-30 ENCOUNTER — HOSPITAL ENCOUNTER (OUTPATIENT)
Age: 67
Discharge: HOME | End: 2022-09-30
Payer: MEDICARE

## 2022-09-30 DIAGNOSIS — R73.03 PREDIABETES: ICD-10-CM

## 2022-09-30 DIAGNOSIS — E78.00 PURE HYPERCHOLESTEROLEMIA: ICD-10-CM

## 2022-09-30 LAB
ALBUMIN SERPL-MCNC: 4.2 G/DL (ref 3.5–5.2)
ALP BLD-CCNC: 57 U/L (ref 35–104)
ALT SERPL-CCNC: 18 U/L (ref 0–32)
ANION GAP SERPL CALCULATED.3IONS-SCNC: 9 MMOL/L (ref 7–16)
AST SERPL-CCNC: 22 U/L (ref 0–31)
BILIRUB SERPL-MCNC: 0.6 MG/DL (ref 0–1.2)
BUN BLDV-MCNC: 11 MG/DL (ref 6–23)
CALCIUM SERPL-MCNC: 9.6 MG/DL (ref 8.6–10.2)
CHLORIDE BLD-SCNC: 105 MMOL/L (ref 98–107)
CHOLESTEROL, TOTAL: 205 MG/DL (ref 0–199)
CO2: 26 MMOL/L (ref 22–29)
CREAT SERPL-MCNC: 0.6 MG/DL (ref 0.5–1)
GFR AFRICAN AMERICAN: >60
GFR NON-AFRICAN AMERICAN: >60 ML/MIN/1.73
GLUCOSE BLD-MCNC: 122 MG/DL (ref 74–99)
HBA1C MFR BLD: 5.4 % (ref 4–5.6)
HDLC SERPL-MCNC: 89 MG/DL
LDL CHOLESTEROL CALCULATED: 101 MG/DL (ref 0–99)
POTASSIUM SERPL-SCNC: 4.9 MMOL/L (ref 3.5–5)
SODIUM BLD-SCNC: 140 MMOL/L (ref 132–146)
TOTAL PROTEIN: 7.1 G/DL (ref 6.4–8.3)
TRIGL SERPL-MCNC: 76 MG/DL (ref 0–149)
VLDLC SERPL CALC-MCNC: 15 MG/DL

## 2022-09-30 PROCEDURE — 36415 COLL VENOUS BLD VENIPUNCTURE: CPT

## 2022-09-30 PROCEDURE — 80053 COMPREHEN METABOLIC PANEL: CPT

## 2022-09-30 PROCEDURE — 80061 LIPID PANEL: CPT

## 2022-09-30 PROCEDURE — 83036 HEMOGLOBIN GLYCOSYLATED A1C: CPT

## 2022-10-04 ENCOUNTER — OFFICE VISIT (OUTPATIENT)
Dept: PRIMARY CARE CLINIC | Age: 67
End: 2022-10-04
Payer: MEDICARE

## 2022-10-04 VITALS
SYSTOLIC BLOOD PRESSURE: 118 MMHG | OXYGEN SATURATION: 97 % | DIASTOLIC BLOOD PRESSURE: 78 MMHG | RESPIRATION RATE: 22 BRPM | WEIGHT: 157.7 LBS | HEIGHT: 64 IN | TEMPERATURE: 97.4 F | HEART RATE: 80 BPM | BODY MASS INDEX: 26.92 KG/M2

## 2022-10-04 DIAGNOSIS — I10 ESSENTIAL HYPERTENSION: Primary | ICD-10-CM

## 2022-10-04 DIAGNOSIS — Z23 NEED FOR VACCINATION: ICD-10-CM

## 2022-10-04 DIAGNOSIS — F17.210 CIGARETTE NICOTINE DEPENDENCE WITHOUT COMPLICATION: ICD-10-CM

## 2022-10-04 DIAGNOSIS — R73.03 PREDIABETES: ICD-10-CM

## 2022-10-04 DIAGNOSIS — E78.00 PURE HYPERCHOLESTEROLEMIA: ICD-10-CM

## 2022-10-04 PROCEDURE — 99214 OFFICE O/P EST MOD 30 MIN: CPT | Performed by: INTERNAL MEDICINE

## 2022-10-04 PROCEDURE — 1123F ACP DISCUSS/DSCN MKR DOCD: CPT | Performed by: INTERNAL MEDICINE

## 2022-10-04 PROCEDURE — 90694 VACC AIIV4 NO PRSRV 0.5ML IM: CPT | Performed by: INTERNAL MEDICINE

## 2022-10-04 PROCEDURE — G0008 ADMIN INFLUENZA VIRUS VAC: HCPCS | Performed by: INTERNAL MEDICINE

## 2022-10-04 NOTE — PROGRESS NOTES
Danielle Cox  10/4/22     Chief Complaint   Patient presents with    Diverticulitis    Hypertension        Allergies   Allergen Reactions    Penicillins     Keflex [Cephalexin]         Current Outpatient Medications   Medication Sig Dispense Refill    lisinopril (PRINIVIL;ZESTRIL) 10 MG tablet Take 1 tablet by mouth daily 90 tablet 3    azelastine (ASTELIN) 0.1 % nasal spray 1-2 sprays by Nasal route 2 times daily as needed for Rhinitis Use in each nostril as directed 30 mL 1    atenolol (TENORMIN) 25 MG tablet Take 1 tablet every day by oral route. 90 tablet 3    fluticasone (FLONASE) 50 MCG/ACT nasal spray 2 SPRAYS BY EACH NOSTRIL ROUTE IN THE MORNING. 48 g 1    ondansetron (ZOFRAN) 4 MG tablet Take 1 tablet by mouth 3 times daily as needed for Nausea or Vomiting 15 tablet 1    metroNIDAZOLE (FLAGYL) 500 MG tablet TAKE 1 TABLET BY MOUTH 3 TIMES A DAY FOR 10 DAYS (DR ONLY CALLED IN 21)      ciprofloxacin (CIPRO) 500 MG tablet       mupirocin (BACTROBAN) 2 % ointment Mixed in nasal saline, 2 sprays each nostril twice daily for 14 days 1 each 1    rosuvastatin (CRESTOR) 5 MG tablet Take 1 tablet by mouth in the morning. 30 tablet 5    dicyclomine (BENTYL) 20 MG tablet TAKE 1 TABLET BY MOUTH TWICE A DAY AS NEEDED FOR PAIN OF THE ABODOMEN      hydroCHLOROthiazide (MICROZIDE) 12.5 MG capsule Take 1 capsule by mouth Twice a Week 24 capsule 3    Cholecalciferol (VITAMIN D) 50 MCG (2000 UT) CAPS capsule Take 2,000 Units by mouth daily       No current facility-administered medications for this visit. HPI: Patient comes in for follow-up visit. Currently she is feeling fairly well. Her diverticulitis has resolved. She denies any chest pain or shortness of breath. Currently she denies any abdominal pain. She remains on all of her usual meds and supplements the same as listed on her med list.  Unfortunately she continues to smoke cigarettes.     Review of Systems: as per HPI      Physical Exam:    Patient is a 79 y.o. female. Patient appears to be in no distress. Breathing comfortably. Ambulates without assistance. HEENT: normal.  Neck supple, no adenopathy or bruits. Heart RR, no MGR. Lungs clear. Abd: normal  Ext: no edema. Peripheral pulses: normal.  No neurologic deficits noted. Lab Results   Component Value Date    WBC 7.8 08/25/2022    HGB 13.1 08/25/2022    HCT 37.8 08/25/2022     08/25/2022    CHOL 205 (H) 09/30/2022    TRIG 76 09/30/2022    HDL 89 09/30/2022    ALT 18 09/30/2022    AST 22 09/30/2022    TSH 1.620 08/25/2022    INR 1.0 01/12/2019    LABA1C 5.4 09/30/2022      Lab Results   Component Value Date     09/30/2022    K 4.9 09/30/2022     09/30/2022    CO2 26 09/30/2022    BUN 11 09/30/2022    CREATININE 0.6 09/30/2022    GLUCOSE 122 (H) 09/30/2022    CALCIUM 9.6 09/30/2022    PROT 7.1 09/30/2022    LABALBU 4.2 09/30/2022    BILITOT 0.6 09/30/2022    ALKPHOS 57 09/30/2022    AST 22 09/30/2022    ALT 18 09/30/2022    LABGLOM >60 09/30/2022    GFRAA >60 09/30/2022            Assessment:    Brian Flores was seen today for diverticulitis and hypertension. Diagnoses and all orders for this visit:    Essential hypertension, well controlled on current meds. -     Comprehensive Metabolic Panel; Future  -     CBC; Future  -     TSH; Future    Pure hypercholesterolemia, borderline control on rosuvastatin 5 mg daily.  -     Lipid Panel; Future    History of prediabetes, currently with a hemoglobin A1c 5.4%. -     Hemoglobin A1C; Future    Cigarette nicotine dependence without complication    Need for vaccination  -     Influenza, FLUAD, (age 72 y+), IM, Preservative Free, 0.5 mL        Discussion Notes: She will continue her usual meds and supplements the same as listed on her med list.  She is encouraged to quit smoking. She is advised to try to follow a low-cholesterol, heart healthy diet and try to exercise on a regular basis.   Otherwise she will return as needed or in 6 months for routine follow-up visit and repeat labs.

## 2023-01-03 RX ORDER — ROSUVASTATIN CALCIUM 5 MG/1
5 TABLET, COATED ORAL DAILY
Qty: 30 TABLET | Refills: 5 | Status: SHIPPED | OUTPATIENT
Start: 2023-01-03

## 2023-01-23 ENCOUNTER — TELEPHONE (OUTPATIENT)
Dept: PRIMARY CARE CLINIC | Age: 68
End: 2023-01-23

## 2023-01-23 DIAGNOSIS — N81.4 UTERINE PROLAPSE: Primary | ICD-10-CM

## 2023-01-23 NOTE — TELEPHONE ENCOUNTER
Pt calling she would like referral to urogynecology for prolapse of somekind, she feels like something is falling down in her vaginal area.     advise

## 2023-03-13 RX ORDER — HYDROCHLOROTHIAZIDE 12.5 MG/1
CAPSULE, GELATIN COATED ORAL
Qty: 24 CAPSULE | Refills: 3 | Status: SHIPPED | OUTPATIENT
Start: 2023-03-13

## 2023-04-04 ENCOUNTER — HOSPITAL ENCOUNTER (OUTPATIENT)
Age: 68
Discharge: HOME | End: 2023-04-04
Payer: MEDICARE

## 2023-04-04 DIAGNOSIS — E78.00 PURE HYPERCHOLESTEROLEMIA: ICD-10-CM

## 2023-04-04 DIAGNOSIS — I10 ESSENTIAL HYPERTENSION: ICD-10-CM

## 2023-04-04 DIAGNOSIS — R73.03 PREDIABETES: ICD-10-CM

## 2023-04-04 LAB
ALBUMIN SERPL-MCNC: 4.1 G/DL (ref 3.5–5.2)
ALP SERPL-CCNC: 58 U/L (ref 35–104)
ALT SERPL-CCNC: 17 U/L (ref 0–32)
ANION GAP SERPL CALCULATED.3IONS-SCNC: 11 MMOL/L (ref 7–16)
AST SERPL-CCNC: 19 U/L (ref 0–31)
BILIRUB SERPL-MCNC: 0.5 MG/DL (ref 0–1.2)
BUN SERPL-MCNC: 13 MG/DL (ref 6–23)
CALCIUM SERPL-MCNC: 9.2 MG/DL (ref 8.6–10.2)
CHLORIDE SERPL-SCNC: 106 MMOL/L (ref 98–107)
CHOLESTEROL, TOTAL: 186 MG/DL (ref 0–199)
CO2 SERPL-SCNC: 25 MMOL/L (ref 22–29)
CREAT SERPL-MCNC: 0.7 MG/DL (ref 0.5–1)
ERYTHROCYTE [DISTWIDTH] IN BLOOD BY AUTOMATED COUNT: 12.8 FL (ref 11.5–15)
GLUCOSE SERPL-MCNC: 117 MG/DL (ref 74–99)
HBA1C MFR BLD: 5.3 % (ref 4–5.6)
HCT VFR BLD AUTO: 43.4 % (ref 34–48)
HDLC SERPL-MCNC: 84 MG/DL
HGB BLD-MCNC: 14.4 G/DL (ref 11.5–15.5)
LDLC SERPL CALC-MCNC: 88 MG/DL (ref 0–99)
MCH RBC QN AUTO: 33 PG (ref 26–35)
MCHC RBC AUTO-ENTMCNC: 33.2 % (ref 32–34.5)
MCV RBC AUTO: 99.5 FL (ref 80–99.9)
PLATELET # BLD AUTO: 189 E9/L (ref 130–450)
PMV BLD AUTO: 9.4 FL (ref 7–12)
POTASSIUM SERPL-SCNC: 4.5 MMOL/L (ref 3.5–5)
PROT SERPL-MCNC: 6.8 G/DL (ref 6.4–8.3)
RBC # BLD AUTO: 4.36 E12/L (ref 3.5–5.5)
SODIUM SERPL-SCNC: 142 MMOL/L (ref 132–146)
TRIGL SERPL-MCNC: 68 MG/DL (ref 0–149)
TSH SERPL-MCNC: 2.19 UIU/ML (ref 0.27–4.2)
VLDLC SERPL CALC-MCNC: 14 MG/DL
WBC # BLD: 4.7 E9/L (ref 4.5–11.5)

## 2023-04-04 PROCEDURE — 83036 HEMOGLOBIN GLYCOSYLATED A1C: CPT

## 2023-04-04 PROCEDURE — 80053 COMPREHEN METABOLIC PANEL: CPT

## 2023-04-04 PROCEDURE — 85027 COMPLETE CBC AUTOMATED: CPT

## 2023-04-04 PROCEDURE — 36415 COLL VENOUS BLD VENIPUNCTURE: CPT

## 2023-04-04 PROCEDURE — 84443 ASSAY THYROID STIM HORMONE: CPT

## 2023-04-04 PROCEDURE — 80061 LIPID PANEL: CPT

## 2023-06-26 RX ORDER — ROSUVASTATIN CALCIUM 5 MG/1
TABLET, COATED ORAL
Qty: 90 TABLET | Refills: 1 | Status: SHIPPED | OUTPATIENT
Start: 2023-06-26

## 2023-09-03 DIAGNOSIS — I10 ESSENTIAL HYPERTENSION: ICD-10-CM

## 2023-09-05 RX ORDER — ATENOLOL 25 MG/1
TABLET ORAL
Qty: 90 TABLET | Refills: 3 | Status: SHIPPED | OUTPATIENT
Start: 2023-09-05

## 2023-09-08 DIAGNOSIS — I10 ESSENTIAL HYPERTENSION: ICD-10-CM

## 2023-09-08 RX ORDER — LISINOPRIL 10 MG/1
TABLET ORAL
Qty: 90 TABLET | Refills: 3 | Status: SHIPPED | OUTPATIENT
Start: 2023-09-08

## 2023-10-09 ENCOUNTER — HOSPITAL ENCOUNTER (OUTPATIENT)
Age: 68
Discharge: HOME | End: 2023-10-09
Payer: MEDICARE

## 2023-10-09 LAB
ALBUMIN SERPL-MCNC: 4.3 G/DL (ref 3.5–5.2)
ALP SERPL-CCNC: 59 U/L (ref 35–104)
ALT SERPL-CCNC: 21 U/L (ref 0–32)
ANION GAP SERPL CALCULATED.3IONS-SCNC: 7 MMOL/L (ref 7–16)
AST SERPL-CCNC: 21 U/L (ref 0–31)
BILIRUB SERPL-MCNC: 0.5 MG/DL (ref 0–1.2)
BUN SERPL-MCNC: 12 MG/DL (ref 6–23)
CALCIUM SERPL-MCNC: 9.1 MG/DL (ref 8.6–10.2)
CHLORIDE SERPL-SCNC: 106 MMOL/L (ref 98–107)
CHOLEST SERPL-MCNC: 207 MG/DL
CO2 SERPL-SCNC: 26 MMOL/L (ref 22–29)
CREAT SERPL-MCNC: 0.7 MG/DL (ref 0.5–1)
ERYTHROCYTE [DISTWIDTH] IN BLOOD BY AUTOMATED COUNT: 13.1 % (ref 11.5–15)
GFR SERPL CREATININE-BSD FRML MDRD: >60 ML/MIN/1.73M2
GLUCOSE SERPL-MCNC: 123 MG/DL (ref 74–99)
HBA1C MFR BLD: 5.7 % (ref 4–5.6)
HCT VFR BLD AUTO: 43.1 % (ref 34–48)
HDLC SERPL-MCNC: 102 MG/DL
HGB BLD-MCNC: 14.2 G/DL (ref 11.5–15.5)
LDLC SERPL CALC-MCNC: 93 MG/DL
MCH RBC QN AUTO: 33.1 PG (ref 26–35)
MCHC RBC AUTO-ENTMCNC: 32.9 G/DL (ref 32–34.5)
MCV RBC AUTO: 100.5 FL (ref 80–99.9)
PLATELET # BLD AUTO: 191 K/UL (ref 130–450)
PMV BLD AUTO: 9.2 FL (ref 7–12)
POTASSIUM SERPL-SCNC: 4.7 MMOL/L (ref 3.5–5)
PROT SERPL-MCNC: 6.8 G/DL (ref 6.4–8.3)
RBC # BLD AUTO: 4.29 M/UL (ref 3.5–5.5)
SODIUM SERPL-SCNC: 139 MMOL/L (ref 132–146)
TRIGL SERPL-MCNC: 58 MG/DL
TSH SERPL DL<=0.05 MIU/L-ACNC: 1.9 UIU/ML (ref 0.27–4.2)
VLDLC SERPL CALC-MCNC: 12 MG/DL
WBC OTHER # BLD: 4.6 K/UL (ref 4.5–11.5)

## 2023-10-09 PROCEDURE — 80061 LIPID PANEL: CPT

## 2023-10-09 PROCEDURE — 80053 COMPREHEN METABOLIC PANEL: CPT

## 2023-10-09 PROCEDURE — 36415 COLL VENOUS BLD VENIPUNCTURE: CPT

## 2023-10-09 PROCEDURE — 84443 ASSAY THYROID STIM HORMONE: CPT

## 2023-10-09 PROCEDURE — 85027 COMPLETE CBC AUTOMATED: CPT

## 2023-10-09 PROCEDURE — 83036 HEMOGLOBIN GLYCOSYLATED A1C: CPT

## 2023-10-10 ENCOUNTER — OFFICE VISIT (OUTPATIENT)
Dept: PRIMARY CARE CLINIC | Age: 68
End: 2023-10-10

## 2023-10-10 VITALS
HEIGHT: 64 IN | SYSTOLIC BLOOD PRESSURE: 136 MMHG | TEMPERATURE: 97 F | HEART RATE: 80 BPM | WEIGHT: 154 LBS | BODY MASS INDEX: 26.29 KG/M2 | OXYGEN SATURATION: 96 % | DIASTOLIC BLOOD PRESSURE: 70 MMHG | RESPIRATION RATE: 18 BRPM

## 2023-10-10 DIAGNOSIS — F17.210 CIGARETTE NICOTINE DEPENDENCE WITHOUT COMPLICATION: ICD-10-CM

## 2023-10-10 DIAGNOSIS — E78.00 PURE HYPERCHOLESTEROLEMIA: ICD-10-CM

## 2023-10-10 DIAGNOSIS — G89.29 CHRONIC MIDLINE LOW BACK PAIN WITHOUT SCIATICA: ICD-10-CM

## 2023-10-10 DIAGNOSIS — Z86.010 HISTORY OF COLON POLYPS: ICD-10-CM

## 2023-10-10 DIAGNOSIS — M51.36 LUMBAR DEGENERATIVE DISC DISEASE: ICD-10-CM

## 2023-10-10 DIAGNOSIS — I10 HYPERTENSION, ESSENTIAL: Primary | ICD-10-CM

## 2023-10-10 DIAGNOSIS — Z12.31 SCREENING MAMMOGRAM FOR BREAST CANCER: ICD-10-CM

## 2023-10-10 DIAGNOSIS — R73.03 PREDIABETES: ICD-10-CM

## 2023-10-10 DIAGNOSIS — E55.9 VITAMIN D DEFICIENCY: ICD-10-CM

## 2023-10-10 DIAGNOSIS — M54.50 CHRONIC MIDLINE LOW BACK PAIN WITHOUT SCIATICA: ICD-10-CM

## 2023-10-10 DIAGNOSIS — R53.83 FATIGUE, UNSPECIFIED TYPE: ICD-10-CM

## 2023-10-10 NOTE — PROGRESS NOTES
Lungs clear. Abd: normal  Ext: no edema. Peripheral pulses: normal.  No neurologic deficits noted. Lab Results   Component Value Date    WBC 4.6 10/09/2023    HGB 14.2 10/09/2023    HCT 43.1 10/09/2023     10/09/2023    CHOL 186 04/04/2023    TRIG 68 04/04/2023     10/09/2023    ALT 21 10/09/2023    AST 21 10/09/2023    TSH 1.90 10/09/2023    INR 1.0 01/12/2019    LABA1C 5.7 (H) 10/09/2023      Lab Results   Component Value Date     10/09/2023    K 4.7 10/09/2023     10/09/2023    CO2 26 10/09/2023    BUN 12 10/09/2023    CREATININE 0.7 10/09/2023    GLUCOSE 123 (H) 10/09/2023    CALCIUM 9.1 10/09/2023    PROT 6.8 10/09/2023    LABALBU 4.3 10/09/2023    BILITOT 0.5 10/09/2023    ALKPHOS 59 10/09/2023    AST 21 10/09/2023    ALT 21 10/09/2023    LABGLOM >60 10/09/2023    GFRAA >60 09/30/2022            Assessment:    Rome Cali was seen today for hypertension. Diagnoses and all orders for this visit:    Hypertension, essential fair control on current meds. -     Comprehensive Metabolic Panel; Future  -     CBC; Future  -     TSH; Future    Pure hypercholesterolemia, well controlled on rosuvastatin 5 mg daily.  -     Lipid Panel; Future    Prediabetes, currently with a hemoglobin A1c 5.7%. -     Hemoglobin A1C; Future    Fatigue, unspecified type    Lumbar degenerative disc disease, causing chronic low back pain. Chronic midline low back pain without sciatica    Vitamin D deficiency, on replacement therapy. Cigarette nicotine dependence without complication    History of colon polyps    Screening mammogram for breast cancer  -     [facility] AIDAN DIGITAL SCREEN BILATERAL PER PROTOCOL; Future          Discussion Notes: She will continue all of her current meds and supplements the same as listed on her med list.  She is strongly advised to quit smoking. She is encouraged to try to maintain a low-cholesterol, heart healthy diet and try to exercise on a regular basis as tolerated.   She is

## 2023-10-23 ENCOUNTER — TELEPHONE (OUTPATIENT)
Dept: PRIMARY CARE CLINIC | Age: 68
End: 2023-10-23

## 2023-10-23 DIAGNOSIS — Z78.0 MENOPAUSE: Primary | ICD-10-CM

## 2023-10-23 NOTE — TELEPHONE ENCOUNTER
Please make hector scan available to have done at [facility] pt is going there tomorrow for a Mammo

## 2023-10-30 DIAGNOSIS — Z12.31 SCREENING MAMMOGRAM FOR BREAST CANCER: ICD-10-CM

## 2023-11-07 DIAGNOSIS — Z78.0 MENOPAUSE: ICD-10-CM

## 2023-12-29 RX ORDER — ROSUVASTATIN CALCIUM 5 MG/1
TABLET, COATED ORAL
Qty: 90 TABLET | Refills: 1 | Status: SHIPPED | OUTPATIENT
Start: 2023-12-29

## 2024-03-21 RX ORDER — HYDROCHLOROTHIAZIDE 12.5 MG/1
CAPSULE, GELATIN COATED ORAL
Qty: 24 CAPSULE | Refills: 1 | Status: SHIPPED | OUTPATIENT
Start: 2024-03-21

## 2024-04-09 SDOH — ECONOMIC STABILITY: FOOD INSECURITY: WITHIN THE PAST 12 MONTHS, THE FOOD YOU BOUGHT JUST DIDN'T LAST AND YOU DIDN'T HAVE MONEY TO GET MORE.: NEVER TRUE

## 2024-04-09 SDOH — ECONOMIC STABILITY: INCOME INSECURITY: HOW HARD IS IT FOR YOU TO PAY FOR THE VERY BASICS LIKE FOOD, HOUSING, MEDICAL CARE, AND HEATING?: NOT HARD AT ALL

## 2024-04-09 SDOH — ECONOMIC STABILITY: FOOD INSECURITY: WITHIN THE PAST 12 MONTHS, YOU WORRIED THAT YOUR FOOD WOULD RUN OUT BEFORE YOU GOT MONEY TO BUY MORE.: NEVER TRUE

## 2024-04-09 SDOH — ECONOMIC STABILITY: HOUSING INSECURITY
IN THE LAST 12 MONTHS, WAS THERE A TIME WHEN YOU DID NOT HAVE A STEADY PLACE TO SLEEP OR SLEPT IN A SHELTER (INCLUDING NOW)?: NO

## 2024-04-09 SDOH — ECONOMIC STABILITY: TRANSPORTATION INSECURITY
IN THE PAST 12 MONTHS, HAS LACK OF TRANSPORTATION KEPT YOU FROM MEETINGS, WORK, OR FROM GETTING THINGS NEEDED FOR DAILY LIVING?: NO

## 2024-04-09 SDOH — HEALTH STABILITY: PHYSICAL HEALTH: ON AVERAGE, HOW MANY DAYS PER WEEK DO YOU ENGAGE IN MODERATE TO STRENUOUS EXERCISE (LIKE A BRISK WALK)?: 6 DAYS

## 2024-04-09 ASSESSMENT — PATIENT HEALTH QUESTIONNAIRE - PHQ9
SUM OF ALL RESPONSES TO PHQ QUESTIONS 1-9: 0
SUM OF ALL RESPONSES TO PHQ QUESTIONS 1-9: 0
2. FEELING DOWN, DEPRESSED OR HOPELESS: NOT AT ALL
1. LITTLE INTEREST OR PLEASURE IN DOING THINGS: NOT AT ALL
SUM OF ALL RESPONSES TO PHQ QUESTIONS 1-9: 0
SUM OF ALL RESPONSES TO PHQ9 QUESTIONS 1 & 2: 0
SUM OF ALL RESPONSES TO PHQ QUESTIONS 1-9: 0

## 2024-04-09 ASSESSMENT — LIFESTYLE VARIABLES
HOW OFTEN DO YOU HAVE A DRINK CONTAINING ALCOHOL: 1
HOW MANY STANDARD DRINKS CONTAINING ALCOHOL DO YOU HAVE ON A TYPICAL DAY: 0
HOW OFTEN DO YOU HAVE SIX OR MORE DRINKS ON ONE OCCASION: 1
HOW MANY STANDARD DRINKS CONTAINING ALCOHOL DO YOU HAVE ON A TYPICAL DAY: PATIENT DOES NOT DRINK
HOW OFTEN DO YOU HAVE A DRINK CONTAINING ALCOHOL: NEVER

## 2024-04-10 ENCOUNTER — HOSPITAL ENCOUNTER (OUTPATIENT)
Age: 69
Discharge: HOME | End: 2024-04-10
Payer: MEDICARE

## 2024-04-10 DIAGNOSIS — R73.03 PREDIABETES: ICD-10-CM

## 2024-04-10 DIAGNOSIS — I10 HYPERTENSION, ESSENTIAL: ICD-10-CM

## 2024-04-10 DIAGNOSIS — E78.00 PURE HYPERCHOLESTEROLEMIA: ICD-10-CM

## 2024-04-10 LAB
ALBUMIN SERPL-MCNC: 4.4 G/DL (ref 3.5–5.2)
ALP SERPL-CCNC: 51 U/L (ref 35–104)
ALT SERPL-CCNC: 21 U/L (ref 0–32)
ANION GAP SERPL CALCULATED.3IONS-SCNC: 9 MMOL/L (ref 7–16)
AST SERPL-CCNC: 23 U/L (ref 0–31)
BILIRUB SERPL-MCNC: 0.5 MG/DL (ref 0–1.2)
BUN SERPL-MCNC: 15 MG/DL (ref 6–23)
CALCIUM SERPL-MCNC: 9.2 MG/DL (ref 8.6–10.2)
CHLORIDE SERPL-SCNC: 107 MMOL/L (ref 98–107)
CHOLEST SERPL-MCNC: 191 MG/DL
CO2 SERPL-SCNC: 26 MMOL/L (ref 22–29)
CREAT SERPL-MCNC: 0.7 MG/DL (ref 0.5–1)
ERYTHROCYTE [DISTWIDTH] IN BLOOD BY AUTOMATED COUNT: 12.7 % (ref 11.5–15)
GFR SERPL CREATININE-BSD FRML MDRD: >90 ML/MIN/1.73M2
GLUCOSE SERPL-MCNC: 125 MG/DL (ref 74–99)
HBA1C MFR BLD: 5.5 % (ref 4–5.6)
HCT VFR BLD AUTO: 42.5 % (ref 34–48)
HDLC SERPL-MCNC: 87 MG/DL
HGB BLD-MCNC: 14.1 G/DL (ref 11.5–15.5)
LDLC SERPL CALC-MCNC: 92 MG/DL
MCH RBC QN AUTO: 32.7 PG (ref 26–35)
MCHC RBC AUTO-ENTMCNC: 33.2 G/DL (ref 32–34.5)
MCV RBC AUTO: 98.6 FL (ref 80–99.9)
PLATELET # BLD AUTO: 180 K/UL (ref 130–450)
PMV BLD AUTO: 9.3 FL (ref 7–12)
POTASSIUM SERPL-SCNC: 4.5 MMOL/L (ref 3.5–5)
PROT SERPL-MCNC: 7 G/DL (ref 6.4–8.3)
RBC # BLD AUTO: 4.31 M/UL (ref 3.5–5.5)
SODIUM SERPL-SCNC: 142 MMOL/L (ref 132–146)
TRIGL SERPL-MCNC: 61 MG/DL
TSH SERPL DL<=0.05 MIU/L-ACNC: 2.24 UIU/ML (ref 0.27–4.2)
VLDLC SERPL CALC-MCNC: 12 MG/DL
WBC OTHER # BLD: 4.9 K/UL (ref 4.5–11.5)

## 2024-04-10 PROCEDURE — 36415 COLL VENOUS BLD VENIPUNCTURE: CPT

## 2024-04-10 PROCEDURE — 85027 COMPLETE CBC AUTOMATED: CPT

## 2024-04-10 PROCEDURE — 80053 COMPREHEN METABOLIC PANEL: CPT

## 2024-04-10 PROCEDURE — 83036 HEMOGLOBIN GLYCOSYLATED A1C: CPT

## 2024-04-10 PROCEDURE — 84443 ASSAY THYROID STIM HORMONE: CPT

## 2024-04-10 PROCEDURE — 80061 LIPID PANEL: CPT

## 2024-04-12 ENCOUNTER — OFFICE VISIT (OUTPATIENT)
Dept: PRIMARY CARE CLINIC | Age: 69
End: 2024-04-12

## 2024-04-12 VITALS
RESPIRATION RATE: 17 BRPM | SYSTOLIC BLOOD PRESSURE: 138 MMHG | WEIGHT: 157 LBS | HEIGHT: 64 IN | HEART RATE: 69 BPM | DIASTOLIC BLOOD PRESSURE: 88 MMHG | TEMPERATURE: 97 F | OXYGEN SATURATION: 98 % | BODY MASS INDEX: 26.8 KG/M2

## 2024-04-12 DIAGNOSIS — L98.9 SKIN LESION: ICD-10-CM

## 2024-04-12 DIAGNOSIS — L90.0 LICHEN SCLEROSUS: ICD-10-CM

## 2024-04-12 DIAGNOSIS — K58.0 IRRITABLE BOWEL SYNDROME WITH DIARRHEA: ICD-10-CM

## 2024-04-12 DIAGNOSIS — Z00.00 MEDICARE ANNUAL WELLNESS VISIT, SUBSEQUENT: Primary | ICD-10-CM

## 2024-04-12 DIAGNOSIS — Z76.89 ENCOUNTER TO ESTABLISH CARE: ICD-10-CM

## 2024-04-12 DIAGNOSIS — Z87.891 PERSONAL HISTORY OF TOBACCO USE: ICD-10-CM

## 2024-04-12 DIAGNOSIS — F17.200 SMOKER: ICD-10-CM

## 2024-04-12 DIAGNOSIS — I10 HYPERTENSION, ESSENTIAL: ICD-10-CM

## 2024-04-12 DIAGNOSIS — E78.00 PURE HYPERCHOLESTEROLEMIA: ICD-10-CM

## 2024-04-12 DIAGNOSIS — R73.03 PREDIABETES: ICD-10-CM

## 2024-04-12 NOTE — PATIENT INSTRUCTIONS
help you understand what to do next.  After a lung cancer screening, you can go back to your usual activities right away.  A lung cancer screening test can't tell if you have lung cancer. If your results are positive, your doctor can't tell whether an abnormal finding is a harmless nodule, cancer, or something else without doing more tests.  What can you do to help prevent lung cancer?  Some lung cancers can't be prevented. But if you smoke, quitting smoking is the best step you can take to prevent lung cancer. If you want to quit, your doctor can recommend medicines or other ways to help.  Follow-up care is a key part of your treatment and safety. Be sure to make and go to all appointments, and call your doctor if you are having problems. It's also a good idea to know your test results and keep a list of the medicines you take.  Where can you learn more?  Go to https://www.[employer].net/patientEd and enter Q940 to learn more about \"Learning About Lung Cancer Screening.\"  Current as of: October 25, 2023               Content Version: 14.0  © 2006-2024 [employer].   Care instructions adapted under license by [employer]. If you have questions about a medical condition or this instruction, always ask your healthcare professional. [employer] disclaims any warranty or liability for your use of this information.           A Healthy Heart: Care Instructions  Overview     Coronary artery disease, also called heart disease, occurs when a substance called plaque builds up in the vessels that supply oxygen-rich blood to your heart muscle. This can narrow the blood vessels and reduce blood flow. A heart attack happens when blood flow is completely blocked. A high-fat diet, smoking, and other factors increase the risk of heart disease.  Your doctor has found that you have a chance of having heart disease. A heart-healthy lifestyle can help keep your heart healthy and prevent heart disease. This

## 2024-04-26 ENCOUNTER — HOSPITAL ENCOUNTER (OUTPATIENT)
Dept: CT IMAGING | Age: 69
End: 2024-04-26
Payer: MEDICARE

## 2024-04-26 DIAGNOSIS — Z87.891 PERSONAL HISTORY OF TOBACCO USE: ICD-10-CM

## 2024-04-26 PROCEDURE — 71271 CT THORAX LUNG CANCER SCR C-: CPT

## 2024-04-30 ENCOUNTER — TELEPHONE (OUTPATIENT)
Dept: CASE MANAGEMENT | Age: 69
End: 2024-04-30

## 2024-04-30 NOTE — TELEPHONE ENCOUNTER
No call, encounter opened to process CT Lung Screening.    CT Lung Screen: 4/26/2024    IMPRESSION:  1. There is no pulmonary infiltrate, mass or suspicious pulmonary nodule.     LUNG RADS:  Lung-RADS 1 - Negative (v2022)     Management:  12 month screening LDCT     RECOMMENDATIONS:  If you would like to register your patient with the [facility] Lung Nodule/Lung  Cancer Screening Program, please contact the Nurse Navigator at  [phone].    Pack years: 51.3    Social History     Tobacco Use  Smoking Status: Current Every Day Smoker   Start Date: 1973   Quit Date:    Types: Cigarettes   Packs/Day: 1   Years: 51.3   Pack Years: 51.3   Smokeless Tobacco:Never         Results letter sent to patient via my chart or mailed.     Kiarra Isidro  Imaging Navigator   [facility]   [phone]

## 2024-06-21 RX ORDER — ROSUVASTATIN CALCIUM 5 MG/1
5 TABLET, COATED ORAL DAILY
Qty: 90 TABLET | Refills: 3 | Status: SHIPPED | OUTPATIENT
Start: 2024-06-21

## 2024-08-27 DIAGNOSIS — I10 ESSENTIAL HYPERTENSION: ICD-10-CM

## 2024-08-27 RX ORDER — LISINOPRIL 10 MG/1
10 TABLET ORAL DAILY
Qty: 90 TABLET | Refills: 3 | Status: SHIPPED | OUTPATIENT
Start: 2024-08-27

## 2024-08-30 DIAGNOSIS — I10 ESSENTIAL HYPERTENSION: ICD-10-CM

## 2024-08-30 RX ORDER — ATENOLOL 25 MG/1
TABLET ORAL
Qty: 90 TABLET | Refills: 3 | Status: SHIPPED | OUTPATIENT
Start: 2024-08-30

## 2024-08-30 RX ORDER — ATENOLOL 25 MG/1
TABLET ORAL
Qty: 90 TABLET | Refills: 3 | Status: CANCELLED | OUTPATIENT
Start: 2024-08-30

## 2024-09-11 ENCOUNTER — TELEPHONE (OUTPATIENT)
Dept: PRIMARY CARE CLINIC | Age: 69
End: 2024-09-11

## 2024-09-11 RX ORDER — HYDROCHLOROTHIAZIDE 12.5 MG/1
12.5 CAPSULE ORAL EVERY MORNING
Qty: 30 CAPSULE | Refills: 3 | Status: SHIPPED | OUTPATIENT
Start: 2024-09-11

## 2024-12-17 RX ORDER — CLOBETASOL PROPIONATE 0.5 MG/G
CREAM TOPICAL 2 TIMES DAILY
COMMUNITY
End: 2024-12-17 | Stop reason: SDUPTHER

## 2024-12-17 RX ORDER — CLOBETASOL PROPIONATE 0.5 MG/G
CREAM TOPICAL 2 TIMES DAILY
Qty: 60 G | Refills: 0 | Status: SHIPPED | OUTPATIENT
Start: 2024-12-17

## 2024-12-17 NOTE — TELEPHONE ENCOUNTER
Name of Medication(s) Requested:  Requested Prescriptions     Pending Prescriptions Disp Refills    clobetasol (TEMOVATE) 0.05 % cream 60 g 0     Sig: Apply topically 2 times daily Apply topically 2 times daily.       Medication is on current medication list Yes    Dosage and directions were verified? Yes    Quantity verified: 30 day supply     Pharmacy Verified?  Yes    Last Appointment:  4/12/2024    Future appts:  Future Appointments   Date Time Provider Department Center   1/6/2025  4:15 PM Stacey Self PA-C [facility]IL [facility] DEP        (If no appt send self scheduling link. .REFILLAPPT)  Scheduling request sent?     [] Yes  [] No    Does patient need updated?  [] Yes  [] No

## 2024-12-17 NOTE — TELEPHONE ENCOUNTER
Pt needs rx for lichen sclerosus send to [facility]  Cristofer, pt has appt coming up on 1/6/25

## 2025-01-03 ENCOUNTER — HOSPITAL ENCOUNTER (OUTPATIENT)
Age: 70
Discharge: HOME | End: 2025-01-03
Payer: MEDICARE

## 2025-01-03 DIAGNOSIS — I10 HYPERTENSION, ESSENTIAL: ICD-10-CM

## 2025-01-03 DIAGNOSIS — R73.03 PREDIABETES: ICD-10-CM

## 2025-01-03 DIAGNOSIS — E78.00 PURE HYPERCHOLESTEROLEMIA: ICD-10-CM

## 2025-01-03 LAB
ALBUMIN SERPL-MCNC: 4.3 G/DL (ref 3.5–5.2)
ALP SERPL-CCNC: 58 U/L (ref 35–104)
ALT SERPL-CCNC: 21 U/L (ref 0–32)
ANION GAP SERPL CALCULATED.3IONS-SCNC: 8 MMOL/L (ref 7–16)
AST SERPL-CCNC: 20 U/L (ref 0–31)
BASOPHILS # BLD: 0.02 K/UL (ref 0–0.2)
BASOPHILS NFR BLD: 0 % (ref 0–2)
BILIRUB SERPL-MCNC: 0.6 MG/DL (ref 0–1.2)
BUN SERPL-MCNC: 19 MG/DL (ref 6–23)
CALCIUM SERPL-MCNC: 9.3 MG/DL (ref 8.6–10.2)
CHLORIDE SERPL-SCNC: 104 MMOL/L (ref 98–107)
CHOLEST SERPL-MCNC: 198 MG/DL
CO2 SERPL-SCNC: 27 MMOL/L (ref 22–29)
CREAT SERPL-MCNC: 0.7 MG/DL (ref 0.5–1)
EOSINOPHIL # BLD: 0.1 K/UL (ref 0.05–0.5)
EOSINOPHILS RELATIVE PERCENT: 2 % (ref 0–6)
ERYTHROCYTE [DISTWIDTH] IN BLOOD BY AUTOMATED COUNT: 13 % (ref 11.5–15)
GFR, ESTIMATED: >90 ML/MIN/1.73M2
GLUCOSE SERPL-MCNC: 120 MG/DL (ref 74–99)
HBA1C MFR BLD: 5.6 % (ref 4–5.6)
HCT VFR BLD AUTO: 43 % (ref 34–48)
HDLC SERPL-MCNC: 102 MG/DL
HGB BLD-MCNC: 14.4 G/DL (ref 11.5–15.5)
IMM GRANULOCYTES # BLD AUTO: <0.03 K/UL (ref 0–0.58)
IMM GRANULOCYTES NFR BLD: 0 % (ref 0–5)
LDLC SERPL CALC-MCNC: 84 MG/DL
LYMPHOCYTES NFR BLD: 1.67 K/UL (ref 1.5–4)
LYMPHOCYTES RELATIVE PERCENT: 33 % (ref 20–42)
MCH RBC QN AUTO: 32.7 PG (ref 26–35)
MCHC RBC AUTO-ENTMCNC: 33.5 G/DL (ref 32–34.5)
MCV RBC AUTO: 97.5 FL (ref 80–99.9)
MONOCYTES NFR BLD: 0.36 K/UL (ref 0.1–0.95)
MONOCYTES NFR BLD: 7 % (ref 2–12)
NEUTROPHILS NFR BLD: 58 % (ref 43–80)
NEUTS SEG NFR BLD: 2.96 K/UL (ref 1.8–7.3)
PLATELET # BLD AUTO: 176 K/UL (ref 130–450)
PMV BLD AUTO: 9.4 FL (ref 7–12)
POTASSIUM SERPL-SCNC: 4.5 MMOL/L (ref 3.5–5)
PROT SERPL-MCNC: 7.1 G/DL (ref 6.4–8.3)
RBC # BLD AUTO: 4.41 M/UL (ref 3.5–5.5)
SODIUM SERPL-SCNC: 139 MMOL/L (ref 132–146)
TRIGL SERPL-MCNC: 58 MG/DL
TSH SERPL DL<=0.05 MIU/L-ACNC: 1.66 UIU/ML (ref 0.27–4.2)
VLDLC SERPL CALC-MCNC: 12 MG/DL
WBC OTHER # BLD: 5.1 K/UL (ref 4.5–11.5)

## 2025-01-03 PROCEDURE — 80053 COMPREHEN METABOLIC PANEL: CPT

## 2025-01-03 PROCEDURE — 85025 COMPLETE CBC W/AUTO DIFF WBC: CPT

## 2025-01-03 PROCEDURE — 36415 COLL VENOUS BLD VENIPUNCTURE: CPT

## 2025-01-03 PROCEDURE — 84443 ASSAY THYROID STIM HORMONE: CPT

## 2025-01-03 PROCEDURE — 83036 HEMOGLOBIN GLYCOSYLATED A1C: CPT

## 2025-01-03 PROCEDURE — 80061 LIPID PANEL: CPT

## 2025-01-03 ASSESSMENT — PATIENT HEALTH QUESTIONNAIRE - PHQ9
SUM OF ALL RESPONSES TO PHQ QUESTIONS 1-9: 0
SUM OF ALL RESPONSES TO PHQ9 QUESTIONS 1 & 2: 0
SUM OF ALL RESPONSES TO PHQ QUESTIONS 1-9: 0
1. LITTLE INTEREST OR PLEASURE IN DOING THINGS: NOT AT ALL
1. LITTLE INTEREST OR PLEASURE IN DOING THINGS: NOT AT ALL
2. FEELING DOWN, DEPRESSED OR HOPELESS: NOT AT ALL
SUM OF ALL RESPONSES TO PHQ QUESTIONS 1-9: 0
SUM OF ALL RESPONSES TO PHQ9 QUESTIONS 1 & 2: 0
2. FEELING DOWN, DEPRESSED OR HOPELESS: NOT AT ALL
SUM OF ALL RESPONSES TO PHQ QUESTIONS 1-9: 0

## 2025-01-06 ENCOUNTER — OFFICE VISIT (OUTPATIENT)
Dept: PRIMARY CARE CLINIC | Age: 70
End: 2025-01-06
Payer: MEDICARE

## 2025-01-06 VITALS
TEMPERATURE: 97 F | HEART RATE: 88 BPM | SYSTOLIC BLOOD PRESSURE: 138 MMHG | OXYGEN SATURATION: 97 % | HEIGHT: 64 IN | BODY MASS INDEX: 26.05 KG/M2 | DIASTOLIC BLOOD PRESSURE: 80 MMHG | WEIGHT: 152.6 LBS | RESPIRATION RATE: 18 BRPM

## 2025-01-06 DIAGNOSIS — K58.0 IRRITABLE BOWEL SYNDROME WITH DIARRHEA: ICD-10-CM

## 2025-01-06 DIAGNOSIS — E78.00 PURE HYPERCHOLESTEROLEMIA: ICD-10-CM

## 2025-01-06 DIAGNOSIS — Z00.00 MEDICARE ANNUAL WELLNESS VISIT, SUBSEQUENT: Primary | ICD-10-CM

## 2025-01-06 DIAGNOSIS — I10 HYPERTENSION, ESSENTIAL: ICD-10-CM

## 2025-01-06 DIAGNOSIS — E55.9 VITAMIN D DEFICIENCY: ICD-10-CM

## 2025-01-06 DIAGNOSIS — R73.03 PREDIABETES: ICD-10-CM

## 2025-01-06 DIAGNOSIS — H69.92 DYSFUNCTION OF LEFT EUSTACHIAN TUBE: ICD-10-CM

## 2025-01-06 DIAGNOSIS — Z87.891 PERSONAL HISTORY OF TOBACCO USE, PRESENTING HAZARDS TO HEALTH: ICD-10-CM

## 2025-01-06 PROCEDURE — 1123F ACP DISCUSS/DSCN MKR DOCD: CPT | Performed by: PHYSICIAN ASSISTANT

## 2025-01-06 PROCEDURE — 1160F RVW MEDS BY RX/DR IN RCRD: CPT | Performed by: PHYSICIAN ASSISTANT

## 2025-01-06 PROCEDURE — 99406 BEHAV CHNG SMOKING 3-10 MIN: CPT | Performed by: PHYSICIAN ASSISTANT

## 2025-01-06 PROCEDURE — 1159F MED LIST DOCD IN RCRD: CPT | Performed by: PHYSICIAN ASSISTANT

## 2025-01-06 PROCEDURE — 3079F DIAST BP 80-89 MM HG: CPT | Performed by: PHYSICIAN ASSISTANT

## 2025-01-06 PROCEDURE — 3075F SYST BP GE 130 - 139MM HG: CPT | Performed by: PHYSICIAN ASSISTANT

## 2025-01-06 PROCEDURE — 99213 OFFICE O/P EST LOW 20 MIN: CPT | Performed by: PHYSICIAN ASSISTANT

## 2025-01-06 PROCEDURE — G0439 PPPS, SUBSEQ VISIT: HCPCS | Performed by: PHYSICIAN ASSISTANT

## 2025-01-06 RX ORDER — FLUTICASONE PROPIONATE 50 MCG
1 SPRAY, SUSPENSION (ML) NASAL DAILY
Qty: 32 G | Refills: 1 | Status: SHIPPED | OUTPATIENT
Start: 2025-01-06

## 2025-01-06 RX ORDER — GUAIFENESIN 600 MG/1
600 TABLET, EXTENDED RELEASE ORAL 2 TIMES DAILY
Qty: 30 TABLET | Refills: 0 | Status: SHIPPED | OUTPATIENT
Start: 2025-01-06 | End: 2025-01-21

## 2025-01-06 ASSESSMENT — PATIENT HEALTH QUESTIONNAIRE - PHQ9
SUM OF ALL RESPONSES TO PHQ QUESTIONS 1-9: 0
SUM OF ALL RESPONSES TO PHQ9 QUESTIONS 1 & 2: 0
SUM OF ALL RESPONSES TO PHQ QUESTIONS 1-9: 0
1. LITTLE INTEREST OR PLEASURE IN DOING THINGS: NOT AT ALL
SUM OF ALL RESPONSES TO PHQ QUESTIONS 1-9: 0
2. FEELING DOWN, DEPRESSED OR HOPELESS: NOT AT ALL
SUM OF ALL RESPONSES TO PHQ QUESTIONS 1-9: 0

## 2025-01-06 ASSESSMENT — LIFESTYLE VARIABLES
HOW MANY STANDARD DRINKS CONTAINING ALCOHOL DO YOU HAVE ON A TYPICAL DAY: PATIENT DOES NOT DRINK
HOW OFTEN DO YOU HAVE A DRINK CONTAINING ALCOHOL: NEVER

## 2025-01-06 NOTE — PROGRESS NOTES
Directives:  Do you have a Living Will?: (!) No    Intervention:  has NO advanced directive - not interested in additional information        Tobacco Use:    Tobacco Use      Smoking status: Every Day        Packs/day: 1.00        Years: 1 pack/day for 52.0 years (52.0 ttl pk-yrs)        Types: Cigarettes        Start date: 1/13/1973        Passive exposure: Never      Smokeless tobacco: Never     Interventions:  Patient comments: cutting down intentionally                      Objective   Vitals:    01/06/25 1558   BP: 138/80   Pulse: 88   Resp: 18   Temp: 97 °F (36.1 °C)   SpO2: 97%   Weight: 69.2 kg (152 lb 9.6 oz)   Height: 1.626 m (5' 4.02\")      Body mass index is 26.18 kg/m².      General Appearance: alert and oriented to person, place and time, well developed and well- nourished, in no acute distress  Skin: warm and dry, no rash or erythema  Head: normocephalic and atraumatic  Eyes: pupils equal, round, and reactive to light, extraocular eye movements intact, conjunctivae normal  ENT: tympanic membrane, external ear and ear canal normal bilaterally, nose without deformity, nasal mucosa and turbinates normal without polyps  Neck: supple and non-tender without mass, no thyromegaly or thyroid nodules, no cervical lymphadenopathy  Pulmonary/Chest: clear to auscultation bilaterally- no wheezes, rales or rhonchi, normal air movement, no respiratory distress  Cardiovascular: normal rate, regular rhythm, normal S1 and S2, no murmurs, rubs, clicks, or gallops, distal pulses intact, no carotid bruits  Abdomen: soft, non-tender, non-distended, normal bowel sounds, no masses or organomegaly  Extremities: no cyanosis, clubbing or edema  Musculoskeletal: normal range of motion, no joint swelling, deformity or tenderness  Neurologic: reflexes normal and symmetric, no cranial nerve deficit, gait, coordination and speech normal            Allergies   Allergen Reactions    Penicillins     Keflex [Cephalexin]      Prior to

## 2025-01-07 RX ORDER — HYDROCHLOROTHIAZIDE 12.5 MG/1
12.5 CAPSULE ORAL EVERY MORNING
Qty: 90 CAPSULE | Refills: 1 | Status: SHIPPED | OUTPATIENT
Start: 2025-01-07

## 2025-01-07 NOTE — TELEPHONE ENCOUNTER
Name of Medication(s) Requested:  Requested Prescriptions     Pending Prescriptions Disp Refills    hydroCHLOROthiazide 12.5 MG capsule [Pharmacy Med Name: HYDROCHLOROTHIAZIDE 12.5 MG CP] 90 capsule 1     Sig: TAKE 1 CAPSULE BY MOUTH EVERY DAY IN THE MORNING       Medication is on current medication list Yes    Dosage and directions were verified? Yes    Quantity verified: 90 day supply     Pharmacy Verified?  Yes    Last Appointment:  1/6/2025    Future appts:  Future Appointments   Date Time Provider Department Center   7/11/2025  1:15 PM Stacey Self PA-C TRAIL [facility] E[facility] DEP        (If no appt send self scheduling link. .REFILLAPPT)  Scheduling request sent?     [] Yes  [] No    Does patient need updated?  [] Yes  [] No

## 2025-02-27 DIAGNOSIS — H69.92 DYSFUNCTION OF LEFT EUSTACHIAN TUBE: ICD-10-CM

## 2025-02-27 RX ORDER — FLUTICASONE PROPIONATE 50 MCG
SPRAY, SUSPENSION (ML) NASAL
Qty: 16 G | Refills: 3 | Status: SHIPPED | OUTPATIENT
Start: 2025-02-27

## 2025-02-27 NOTE — TELEPHONE ENCOUNTER
Name of Medication(s) Requested:  Requested Prescriptions     Pending Prescriptions Disp Refills    fluticasone (FLONASE) 50 MCG/ACT nasal spray [Pharmacy Med Name: FLUTICASONE PROP 50 MCG SPRAY]  3     Sig: SPRAY 1 SPRAY INTO EACH NOSTRIL EVERY DAY       Medication is on current medication list Yes    Dosage and directions were verified? Yes    Quantity verified: 90 day supply     Pharmacy Verified?  Yes    Last Appointment:  1/6/2025    Future appts:  Future Appointments   Date Time Provider Department Center   7/11/2025  1:15 PM Stacey Self PA-C TRAIL [facility] E[facility] DEP        (If no appt send self scheduling link. .REFILLAPPT)  Scheduling request sent?     [] Yes  [] No    Does patient need updated?  [] Yes  [] No

## 2025-06-09 DIAGNOSIS — E78.00 PURE HYPERCHOLESTEROLEMIA: Primary | ICD-10-CM

## 2025-06-09 RX ORDER — ROSUVASTATIN CALCIUM 5 MG/1
5 TABLET, COATED ORAL DAILY
Qty: 90 TABLET | Refills: 1 | Status: SHIPPED | OUTPATIENT
Start: 2025-06-09

## 2025-07-09 RX ORDER — HYDROCHLOROTHIAZIDE 12.5 MG/1
12.5 CAPSULE ORAL EVERY MORNING
Qty: 30 CAPSULE | Refills: 0 | Status: SHIPPED | OUTPATIENT
Start: 2025-07-09

## 2025-07-09 NOTE — TELEPHONE ENCOUNTER
Name of Medication(s) Requested:  Requested Prescriptions     Pending Prescriptions Disp Refills    hydroCHLOROthiazide 12.5 MG capsule [Pharmacy Med Name: HYDROCHLOROTHIAZIDE 12.5 MG CP] 30 capsule 0     Sig: TAKE 1 CAPSULE BY MOUTH EVERY DAY IN THE MORNING       Medication is on current medication list Yes    Dosage and directions were verified? Yes    Quantity verified: 30 day supply     Pharmacy Verified?  Yes    Last Appointment:  1/6/2025    Future appts:  Future Appointments   Date Time Provider Department Center   7/16/2025  2:30 PM Stacey Self PA-C TRAIL [facility] E[facility] DEP        (If no appt send self scheduling link. .REFILLAPPT)  Scheduling request sent?     [] Yes  [] No    Does patient need updated?  [] Yes  [] No   Patient complaining of shortness of breath since September. States it is worse this weekend.  Self monitors her BP at home

## 2025-07-13 SDOH — ECONOMIC STABILITY: FOOD INSECURITY: WITHIN THE PAST 12 MONTHS, THE FOOD YOU BOUGHT JUST DIDN'T LAST AND YOU DIDN'T HAVE MONEY TO GET MORE.: NEVER TRUE

## 2025-07-13 SDOH — ECONOMIC STABILITY: INCOME INSECURITY: IN THE LAST 12 MONTHS, WAS THERE A TIME WHEN YOU WERE NOT ABLE TO PAY THE MORTGAGE OR RENT ON TIME?: NO

## 2025-07-13 SDOH — ECONOMIC STABILITY: FOOD INSECURITY: WITHIN THE PAST 12 MONTHS, YOU WORRIED THAT YOUR FOOD WOULD RUN OUT BEFORE YOU GOT MONEY TO BUY MORE.: NEVER TRUE

## 2025-07-13 SDOH — ECONOMIC STABILITY: TRANSPORTATION INSECURITY
IN THE PAST 12 MONTHS, HAS THE LACK OF TRANSPORTATION KEPT YOU FROM MEDICAL APPOINTMENTS OR FROM GETTING MEDICATIONS?: NO

## 2025-07-14 ENCOUNTER — HOSPITAL ENCOUNTER (OUTPATIENT)
Age: 70
Discharge: HOME | End: 2025-07-14
Payer: MEDICARE

## 2025-07-14 DIAGNOSIS — E78.00 PURE HYPERCHOLESTEROLEMIA: ICD-10-CM

## 2025-07-14 DIAGNOSIS — I10 HYPERTENSION, ESSENTIAL: ICD-10-CM

## 2025-07-14 DIAGNOSIS — E55.9 VITAMIN D DEFICIENCY: ICD-10-CM

## 2025-07-14 DIAGNOSIS — R73.03 PREDIABETES: ICD-10-CM

## 2025-07-14 LAB
25(OH)D3 SERPL-MCNC: 36.2 NG/ML (ref 30–100)
ALBUMIN SERPL-MCNC: 4.1 G/DL (ref 3.5–5.2)
ALP SERPL-CCNC: 55 U/L (ref 35–104)
ALT SERPL-CCNC: 29 U/L (ref 0–35)
ANION GAP SERPL CALCULATED.3IONS-SCNC: 11 MMOL/L (ref 7–16)
AST SERPL-CCNC: 22 U/L (ref 0–35)
ATYPICAL LYMPHOCYTE ABSOLUTE COUNT: 0.17 K/UL (ref 0–0.46)
ATYPICAL LYMPHOCYTES: 3 % (ref 0–4)
BASOPHILS # BLD: 0.11 K/UL (ref 0–0.2)
BASOPHILS NFR BLD: 2 % (ref 0–2)
BILIRUB SERPL-MCNC: 0.5 MG/DL (ref 0–1.2)
BUN SERPL-MCNC: 15 MG/DL (ref 8–23)
CALCIUM SERPL-MCNC: 9.4 MG/DL (ref 8.8–10.2)
CHLORIDE SERPL-SCNC: 104 MMOL/L (ref 98–107)
CHOLEST SERPL-MCNC: 153 MG/DL
CO2 SERPL-SCNC: 25 MMOL/L (ref 22–29)
CREAT SERPL-MCNC: 0.7 MG/DL (ref 0.5–1)
EOSINOPHIL # BLD: 0.06 K/UL (ref 0.05–0.5)
EOSINOPHILS RELATIVE PERCENT: 1 % (ref 0–6)
ERYTHROCYTE [DISTWIDTH] IN BLOOD BY AUTOMATED COUNT: 12.7 % (ref 11.5–15)
GFR, ESTIMATED: >90 ML/MIN/1.73M2
GLUCOSE SERPL-MCNC: 114 MG/DL (ref 74–99)
HBA1C MFR BLD: 5.7 % (ref 4–5.6)
HCT VFR BLD AUTO: 41.6 % (ref 34–48)
HDLC SERPL-MCNC: 54 MG/DL
HGB BLD-MCNC: 14.4 G/DL (ref 11.5–15.5)
LDLC SERPL CALC-MCNC: 81 MG/DL
LYMPHOCYTES NFR BLD: 1.54 K/UL (ref 1.5–4)
LYMPHOCYTES RELATIVE PERCENT: 24 % (ref 20–42)
MCH RBC QN AUTO: 32.2 PG (ref 26–35)
MCHC RBC AUTO-ENTMCNC: 34.6 G/DL (ref 32–34.5)
MCV RBC AUTO: 93.1 FL (ref 80–99.9)
MONOCYTES NFR BLD: 0.28 K/UL (ref 0.1–0.95)
MONOCYTES NFR BLD: 4 % (ref 2–12)
MYELOCYTES ABSOLUTE COUNT: 0.06 K/UL
MYELOCYTES: 1 %
NEUTROPHILS NFR BLD: 66 % (ref 43–80)
NEUTS SEG NFR BLD: 4.19 K/UL (ref 1.8–7.3)
PLATELET # BLD AUTO: 192 K/UL (ref 130–450)
PMV BLD AUTO: 9.5 FL (ref 7–12)
POTASSIUM SERPL-SCNC: 4.4 MMOL/L (ref 3.5–5.1)
PROT SERPL-MCNC: 6.6 G/DL (ref 6.4–8.3)
RBC # BLD AUTO: 4.47 M/UL (ref 3.5–5.5)
RBC # BLD: NORMAL 10*6/UL
SODIUM SERPL-SCNC: 140 MMOL/L (ref 136–145)
TRIGL SERPL-MCNC: 91 MG/DL
VLDLC SERPL CALC-MCNC: 18 MG/DL
WBC OTHER # BLD: 6.4 K/UL (ref 4.5–11.5)

## 2025-07-14 PROCEDURE — 82306 VITAMIN D 25 HYDROXY: CPT

## 2025-07-14 PROCEDURE — 80053 COMPREHEN METABOLIC PANEL: CPT

## 2025-07-14 PROCEDURE — 36415 COLL VENOUS BLD VENIPUNCTURE: CPT

## 2025-07-14 PROCEDURE — 85025 COMPLETE CBC W/AUTO DIFF WBC: CPT

## 2025-07-14 PROCEDURE — 83036 HEMOGLOBIN GLYCOSYLATED A1C: CPT

## 2025-07-14 PROCEDURE — 80061 LIPID PANEL: CPT

## 2025-07-16 ENCOUNTER — OFFICE VISIT (OUTPATIENT)
Dept: PRIMARY CARE CLINIC | Age: 70
End: 2025-07-16

## 2025-07-16 VITALS
SYSTOLIC BLOOD PRESSURE: 100 MMHG | RESPIRATION RATE: 18 BRPM | HEIGHT: 64 IN | BODY MASS INDEX: 25.1 KG/M2 | HEART RATE: 72 BPM | WEIGHT: 147 LBS | TEMPERATURE: 97 F | OXYGEN SATURATION: 97 % | DIASTOLIC BLOOD PRESSURE: 60 MMHG

## 2025-07-16 DIAGNOSIS — E78.00 PURE HYPERCHOLESTEROLEMIA: ICD-10-CM

## 2025-07-16 DIAGNOSIS — E55.9 VITAMIN D DEFICIENCY: ICD-10-CM

## 2025-07-16 DIAGNOSIS — I10 ESSENTIAL HYPERTENSION: Primary | ICD-10-CM

## 2025-07-16 DIAGNOSIS — R73.03 PREDIABETES: ICD-10-CM

## 2025-07-16 RX ORDER — HYDROCHLOROTHIAZIDE 12.5 MG/1
12.5 CAPSULE ORAL EVERY MORNING
Qty: 90 CAPSULE | Refills: 3 | Status: SHIPPED | OUTPATIENT
Start: 2025-07-16

## 2025-07-16 RX ORDER — ATENOLOL 25 MG/1
TABLET ORAL
Qty: 90 TABLET | Refills: 3 | Status: SHIPPED | OUTPATIENT
Start: 2025-07-16

## 2025-07-16 RX ORDER — LISINOPRIL 10 MG/1
10 TABLET ORAL DAILY
Qty: 90 TABLET | Refills: 3 | Status: SHIPPED | OUTPATIENT
Start: 2025-07-16

## 2025-07-16 NOTE — PROGRESS NOTES
Hyperlipidemia:  Patient is here to follow up regarding chronic hyperlipidemia.  This is  generally controlled.  Treatment includes Crestor.  Patient is not compliant with lifestyle modifications.  Patient is  a smoker. Down to 8-10 per day.  Most recent labs reviewed with patient today and are not remarkable.  Comorbid conditions include htn and prediabetes.      Lab Results   Component Value Date    LDL 81 07/14/2025        Patient's past medical, surgical, social and/or family history reviewed, updated in chart, and are non-contributory (unless otherwise stated).  Medications and allergies also reviewed and updated in chart.      Review of Systems:  Constitutional:  No fever, no fatigue, no chills, no headaches, no weight change  Dermatology:  No rash, no mole, no dry or sensitive skin  ENT:  No cough, no sore throat, no sinus pain, no runny nose, no ear pain  Cardiology:  No chest pain, no palpitations, no leg edema, no shortness of breath, no PND  Gastroenterology:  No dysphagia, no abdominal pain, no nausea, no vomiting, no constipation, no diarrhea, no heartburn  Musculoskeletal:  No joint pain, no leg cramps, no back pain, no muscle aches  Respiratory:  No shortness of breath, no orthopnea, no wheezing, no ABERNATHY, no hemoptysis  Urology:  No blood in the urine, no urinary frequency, no urinary incontinence, no urinary urgency, no nocturia, no dysuria    Vitals:    07/16/25 1428   BP: 100/60   Pulse: 72   Resp: 18   Temp: 97 °F (36.1 °C)   TempSrc: Temporal   SpO2: 97%   Weight: 66.7 kg (147 lb)   Height: 1.626 m (5' 4.02\")       Physical Exam  Constitutional:       General: She is not in acute distress.     Appearance: Normal appearance. She is well-developed.   HENT:      Head: Normocephalic and atraumatic.      Right Ear: External ear normal.      Left Ear: External ear normal.      Nose: Nose normal.   Eyes:      General: No scleral icterus.     Conjunctiva/sclera: Conjunctivae normal.      Pupils: